# Patient Record
Sex: MALE | Race: WHITE | NOT HISPANIC OR LATINO | URBAN - METROPOLITAN AREA
[De-identification: names, ages, dates, MRNs, and addresses within clinical notes are randomized per-mention and may not be internally consistent; named-entity substitution may affect disease eponyms.]

---

## 2017-04-13 ENCOUNTER — EMERGENCY (EMERGENCY)
Facility: HOSPITAL | Age: 82
LOS: 1 days | Discharge: HOME | End: 2017-04-13
Attending: EMERGENCY MEDICINE | Admitting: EMERGENCY MEDICINE
Payer: MEDICARE

## 2017-04-13 VITALS
SYSTOLIC BLOOD PRESSURE: 198 MMHG | HEART RATE: 66 BPM | RESPIRATION RATE: 18 BRPM | TEMPERATURE: 98 F | DIASTOLIC BLOOD PRESSURE: 77 MMHG | OXYGEN SATURATION: 97 %

## 2017-04-13 VITALS
RESPIRATION RATE: 18 BRPM | HEART RATE: 63 BPM | SYSTOLIC BLOOD PRESSURE: 164 MMHG | DIASTOLIC BLOOD PRESSURE: 71 MMHG | OXYGEN SATURATION: 97 %

## 2017-04-13 DIAGNOSIS — R10.9 UNSPECIFIED ABDOMINAL PAIN: ICD-10-CM

## 2017-04-13 DIAGNOSIS — Z95.1 PRESENCE OF AORTOCORONARY BYPASS GRAFT: Chronic | ICD-10-CM

## 2017-04-13 LAB
ALBUMIN SERPL ELPH-MCNC: 4.4 G/DL — SIGNIFICANT CHANGE UP (ref 3.3–5)
ALP SERPL-CCNC: 45 U/L — SIGNIFICANT CHANGE UP (ref 40–120)
ALT FLD-CCNC: 17 U/L RC — SIGNIFICANT CHANGE UP (ref 10–45)
ANION GAP SERPL CALC-SCNC: 14 MMOL/L — SIGNIFICANT CHANGE UP (ref 5–17)
AST SERPL-CCNC: 21 U/L — SIGNIFICANT CHANGE UP (ref 10–40)
BASOPHILS # BLD AUTO: 0 K/UL — SIGNIFICANT CHANGE UP (ref 0–0.2)
BASOPHILS NFR BLD AUTO: 0.3 % — SIGNIFICANT CHANGE UP (ref 0–2)
BILIRUB SERPL-MCNC: 0.7 MG/DL — SIGNIFICANT CHANGE UP (ref 0.2–1.2)
BUN SERPL-MCNC: 27 MG/DL — HIGH (ref 7–23)
CALCIUM SERPL-MCNC: 9.2 MG/DL — SIGNIFICANT CHANGE UP (ref 8.4–10.5)
CHLORIDE SERPL-SCNC: 105 MMOL/L — SIGNIFICANT CHANGE UP (ref 96–108)
CO2 SERPL-SCNC: 23 MMOL/L — SIGNIFICANT CHANGE UP (ref 22–31)
CREAT SERPL-MCNC: 1.42 MG/DL — HIGH (ref 0.5–1.3)
EOSINOPHIL # BLD AUTO: 0.1 K/UL — SIGNIFICANT CHANGE UP (ref 0–0.5)
EOSINOPHIL NFR BLD AUTO: 1.1 % — SIGNIFICANT CHANGE UP (ref 0–6)
GAS PNL BLDV: SIGNIFICANT CHANGE UP
GLUCOSE SERPL-MCNC: 131 MG/DL — HIGH (ref 70–99)
HCT VFR BLD CALC: 43.6 % — SIGNIFICANT CHANGE UP (ref 39–50)
HGB BLD-MCNC: 14.8 G/DL — SIGNIFICANT CHANGE UP (ref 13–17)
LYMPHOCYTES # BLD AUTO: 1.5 K/UL — SIGNIFICANT CHANGE UP (ref 1–3.3)
LYMPHOCYTES # BLD AUTO: 23.1 % — SIGNIFICANT CHANGE UP (ref 13–44)
MCHC RBC-ENTMCNC: 31.6 PG — SIGNIFICANT CHANGE UP (ref 27–34)
MCHC RBC-ENTMCNC: 33.9 GM/DL — SIGNIFICANT CHANGE UP (ref 32–36)
MCV RBC AUTO: 93.2 FL — SIGNIFICANT CHANGE UP (ref 80–100)
MONOCYTES # BLD AUTO: 0.4 K/UL — SIGNIFICANT CHANGE UP (ref 0–0.9)
MONOCYTES NFR BLD AUTO: 6.4 % — SIGNIFICANT CHANGE UP (ref 2–14)
NEUTROPHILS # BLD AUTO: 4.5 K/UL — SIGNIFICANT CHANGE UP (ref 1.8–7.4)
NEUTROPHILS NFR BLD AUTO: 69.1 % — SIGNIFICANT CHANGE UP (ref 43–77)
PLATELET # BLD AUTO: 142 K/UL — LOW (ref 150–400)
POTASSIUM SERPL-MCNC: 4.5 MMOL/L — SIGNIFICANT CHANGE UP (ref 3.5–5.3)
POTASSIUM SERPL-SCNC: 4.5 MMOL/L — SIGNIFICANT CHANGE UP (ref 3.5–5.3)
PROT SERPL-MCNC: 8 G/DL — SIGNIFICANT CHANGE UP (ref 6–8.3)
RBC # BLD: 4.68 M/UL — SIGNIFICANT CHANGE UP (ref 4.2–5.8)
RBC # FLD: 13.9 % — SIGNIFICANT CHANGE UP (ref 10.3–14.5)
SODIUM SERPL-SCNC: 142 MMOL/L — SIGNIFICANT CHANGE UP (ref 135–145)
WBC # BLD: 6.5 K/UL — SIGNIFICANT CHANGE UP (ref 3.8–10.5)
WBC # FLD AUTO: 6.5 K/UL — SIGNIFICANT CHANGE UP (ref 3.8–10.5)

## 2017-04-13 PROCEDURE — 80053 COMPREHEN METABOLIC PANEL: CPT

## 2017-04-13 PROCEDURE — 74020: CPT

## 2017-04-13 PROCEDURE — 85027 COMPLETE CBC AUTOMATED: CPT

## 2017-04-13 PROCEDURE — 82947 ASSAY GLUCOSE BLOOD QUANT: CPT

## 2017-04-13 PROCEDURE — 85014 HEMATOCRIT: CPT

## 2017-04-13 PROCEDURE — 96374 THER/PROPH/DIAG INJ IV PUSH: CPT | Mod: XU

## 2017-04-13 PROCEDURE — 74177 CT ABD & PELVIS W/CONTRAST: CPT | Mod: 26

## 2017-04-13 PROCEDURE — 99284 EMERGENCY DEPT VISIT MOD MDM: CPT | Mod: 25

## 2017-04-13 PROCEDURE — 83605 ASSAY OF LACTIC ACID: CPT

## 2017-04-13 PROCEDURE — 83690 ASSAY OF LIPASE: CPT

## 2017-04-13 PROCEDURE — 82330 ASSAY OF CALCIUM: CPT

## 2017-04-13 PROCEDURE — 99284 EMERGENCY DEPT VISIT MOD MDM: CPT | Mod: GC

## 2017-04-13 PROCEDURE — 82803 BLOOD GASES ANY COMBINATION: CPT

## 2017-04-13 PROCEDURE — 96375 TX/PRO/DX INJ NEW DRUG ADDON: CPT

## 2017-04-13 PROCEDURE — 82565 ASSAY OF CREATININE: CPT

## 2017-04-13 PROCEDURE — 74020: CPT | Mod: 26

## 2017-04-13 PROCEDURE — 74177 CT ABD & PELVIS W/CONTRAST: CPT

## 2017-04-13 PROCEDURE — 84295 ASSAY OF SERUM SODIUM: CPT

## 2017-04-13 PROCEDURE — 84132 ASSAY OF SERUM POTASSIUM: CPT

## 2017-04-13 PROCEDURE — 82435 ASSAY OF BLOOD CHLORIDE: CPT

## 2017-04-13 RX ORDER — SODIUM CHLORIDE 9 MG/ML
1000 INJECTION INTRAMUSCULAR; INTRAVENOUS; SUBCUTANEOUS ONCE
Qty: 0 | Refills: 0 | Status: COMPLETED | OUTPATIENT
Start: 2017-04-13 | End: 2017-04-13

## 2017-04-13 RX ORDER — ONDANSETRON 8 MG/1
4 TABLET, FILM COATED ORAL ONCE
Qty: 0 | Refills: 0 | Status: COMPLETED | OUTPATIENT
Start: 2017-04-13 | End: 2017-04-13

## 2017-04-13 RX ORDER — ACETAMINOPHEN 500 MG
1000 TABLET ORAL ONCE
Qty: 0 | Refills: 0 | Status: COMPLETED | OUTPATIENT
Start: 2017-04-13 | End: 2017-04-13

## 2017-04-13 RX ADMIN — ONDANSETRON 4 MILLIGRAM(S): 8 TABLET, FILM COATED ORAL at 11:36

## 2017-04-13 RX ADMIN — SODIUM CHLORIDE 2000 MILLILITER(S): 9 INJECTION INTRAMUSCULAR; INTRAVENOUS; SUBCUTANEOUS at 11:39

## 2017-04-13 RX ADMIN — Medication 1000 MILLIGRAM(S): at 12:06

## 2017-04-13 RX ADMIN — Medication 400 MILLIGRAM(S): at 11:36

## 2017-04-13 NOTE — ED PROVIDER NOTE - OBJECTIVE STATEMENT
Attendinyo male presents with pain at the site of abdominal hernia since waking up this AM.  +Belching.  Feels like he has to have a bowel movement or pass gas but he can't.  No vomiting.  Had a bowel movement this AM but it was small.  No fever.  Nothing seems to make symptoms better or worse.  Has not seen a surgeon for this before.  No weight loss.  No blood in stool.  Decreased appetite this AM.  No appetite now.  PCP:  Dr. Bailey

## 2017-04-13 NOTE — ED PROVIDER NOTE - CARE PLAN
Principal Discharge DX:	Ventral hernia without obstruction or gangrene  Instructions for follow-up, activity and diet:	Please return to the OR if you have vomiting, pain, or are unable to push your hernia back in.  You can see a surgeon as an outpatient if you have continued problems with your hernia.

## 2017-04-13 NOTE — ED PROVIDER NOTE - PLAN OF CARE
Please return to the OR if you have vomiting, pain, or are unable to push your hernia back in.  You can see a surgeon as an outpatient if you have continued problems with your hernia.

## 2017-04-13 NOTE — ED PROVIDER NOTE - PROGRESS NOTE DETAILS
Patient's pain is improved.  BP still elevated.  Patient did not take home BP meds this AM as he did not eat.  May take home meds now Patient still not experiencing pain or nausea.  Will PO challenge Patient tolerated PO.  Surgery cleared patient for discharge with precautions for hernia incarceration.  Patient can follow-up with surgery outpatient as needed

## 2017-04-13 NOTE — ED PROVIDER NOTE - MEDICAL DECISION MAKING DETAILS
Abdominal distention and pain at hernia site.  CT abd and pelvis.  CMP, CBC, VBG.  Surgery to evaluate.

## 2019-05-24 ENCOUNTER — INPATIENT (INPATIENT)
Facility: HOSPITAL | Age: 84
LOS: 5 days | Discharge: HOME | DRG: 291 | End: 2019-05-30
Attending: HOSPITALIST | Admitting: HOSPITALIST
Payer: MEDICARE

## 2019-05-24 VITALS
WEIGHT: 125 LBS | OXYGEN SATURATION: 90 % | SYSTOLIC BLOOD PRESSURE: 142 MMHG | RESPIRATION RATE: 22 BRPM | HEART RATE: 67 BPM | DIASTOLIC BLOOD PRESSURE: 70 MMHG | HEIGHT: 67 IN | TEMPERATURE: 99 F

## 2019-05-24 DIAGNOSIS — Z95.1 PRESENCE OF AORTOCORONARY BYPASS GRAFT: Chronic | ICD-10-CM

## 2019-05-24 DIAGNOSIS — R06.02 SHORTNESS OF BREATH: ICD-10-CM

## 2019-05-24 DIAGNOSIS — E03.9 HYPOTHYROIDISM, UNSPECIFIED: ICD-10-CM

## 2019-05-24 DIAGNOSIS — I10 ESSENTIAL (PRIMARY) HYPERTENSION: ICD-10-CM

## 2019-05-24 DIAGNOSIS — K58.9 IRRITABLE BOWEL SYNDROME WITHOUT DIARRHEA: ICD-10-CM

## 2019-05-24 DIAGNOSIS — I25.810 ATHEROSCLEROSIS OF CORONARY ARTERY BYPASS GRAFT(S) WITHOUT ANGINA PECTORIS: ICD-10-CM

## 2019-05-24 DIAGNOSIS — E11.9 TYPE 2 DIABETES MELLITUS WITHOUT COMPLICATIONS: ICD-10-CM

## 2019-05-24 DIAGNOSIS — F41.9 ANXIETY DISORDER, UNSPECIFIED: ICD-10-CM

## 2019-05-24 DIAGNOSIS — Z29.9 ENCOUNTER FOR PROPHYLACTIC MEASURES, UNSPECIFIED: ICD-10-CM

## 2019-05-24 DIAGNOSIS — R60.0 LOCALIZED EDEMA: ICD-10-CM

## 2019-05-24 DIAGNOSIS — N18.3 CHRONIC KIDNEY DISEASE, STAGE 3 (MODERATE): ICD-10-CM

## 2019-05-24 LAB
ALBUMIN SERPL ELPH-MCNC: 3.4 G/DL — SIGNIFICANT CHANGE UP (ref 3.3–5)
ALP SERPL-CCNC: 48 U/L — SIGNIFICANT CHANGE UP (ref 40–120)
ALT FLD-CCNC: 23 U/L — SIGNIFICANT CHANGE UP (ref 10–45)
ANION GAP SERPL CALC-SCNC: 13 MMOL/L — SIGNIFICANT CHANGE UP (ref 5–17)
APTT BLD: 32 SEC — SIGNIFICANT CHANGE UP (ref 27.5–36.3)
AST SERPL-CCNC: 34 U/L — SIGNIFICANT CHANGE UP (ref 10–40)
BASE EXCESS BLDV CALC-SCNC: 0.7 MMOL/L — SIGNIFICANT CHANGE UP (ref -2–2)
BILIRUB SERPL-MCNC: 1.2 MG/DL — SIGNIFICANT CHANGE UP (ref 0.2–1.2)
BUN SERPL-MCNC: 38 MG/DL — HIGH (ref 7–23)
CA-I SERPL-SCNC: 1.18 MMOL/L — SIGNIFICANT CHANGE UP (ref 1.12–1.3)
CALCIUM SERPL-MCNC: 9 MG/DL — SIGNIFICANT CHANGE UP (ref 8.4–10.5)
CHLORIDE BLDV-SCNC: 107 MMOL/L — SIGNIFICANT CHANGE UP (ref 96–108)
CHLORIDE SERPL-SCNC: 102 MMOL/L — SIGNIFICANT CHANGE UP (ref 96–108)
CK MB CFR SERPL CALC: 2.5 NG/ML — SIGNIFICANT CHANGE UP (ref 0–6.7)
CK SERPL-CCNC: 79 U/L — SIGNIFICANT CHANGE UP (ref 30–200)
CO2 BLDV-SCNC: 26 MMOL/L — SIGNIFICANT CHANGE UP (ref 22–30)
CO2 SERPL-SCNC: 22 MMOL/L — SIGNIFICANT CHANGE UP (ref 22–31)
CREAT SERPL-MCNC: 1.62 MG/DL — HIGH (ref 0.5–1.3)
GAS PNL BLDV: 135 MMOL/L — LOW (ref 136–145)
GAS PNL BLDV: SIGNIFICANT CHANGE UP
GAS PNL BLDV: SIGNIFICANT CHANGE UP
GLUCOSE BLDV-MCNC: 121 MG/DL — HIGH (ref 70–99)
GLUCOSE SERPL-MCNC: 133 MG/DL — HIGH (ref 70–99)
HCO3 BLDV-SCNC: 25 MMOL/L — SIGNIFICANT CHANGE UP (ref 21–29)
HCT VFR BLD CALC: 36.1 % — LOW (ref 39–50)
HCT VFR BLDA CALC: 36 % — LOW (ref 39–50)
HGB BLD CALC-MCNC: 11.6 G/DL — LOW (ref 13–17)
HGB BLD-MCNC: 11.7 G/DL — LOW (ref 13–17)
INR BLD: 1.41 RATIO — HIGH (ref 0.88–1.16)
LACTATE BLDV-MCNC: 1.4 MMOL/L — SIGNIFICANT CHANGE UP (ref 0.7–2)
MAGNESIUM SERPL-MCNC: 2.2 MG/DL — SIGNIFICANT CHANGE UP (ref 1.6–2.6)
MCHC RBC-ENTMCNC: 31.5 PG — SIGNIFICANT CHANGE UP (ref 27–34)
MCHC RBC-ENTMCNC: 32.5 GM/DL — SIGNIFICANT CHANGE UP (ref 32–36)
MCV RBC AUTO: 96.9 FL — SIGNIFICANT CHANGE UP (ref 80–100)
NT-PROBNP SERPL-SCNC: HIGH PG/ML (ref 0–300)
PCO2 BLDV: 42 MMHG — SIGNIFICANT CHANGE UP (ref 35–50)
PH BLDV: 7.4 — SIGNIFICANT CHANGE UP (ref 7.35–7.45)
PLATELET # BLD AUTO: 158 K/UL — SIGNIFICANT CHANGE UP (ref 150–400)
PO2 BLDV: 28 MMHG — SIGNIFICANT CHANGE UP (ref 25–45)
POTASSIUM BLDV-SCNC: 3.8 MMOL/L — SIGNIFICANT CHANGE UP (ref 3.5–5.3)
POTASSIUM SERPL-MCNC: 3.9 MMOL/L — SIGNIFICANT CHANGE UP (ref 3.5–5.3)
POTASSIUM SERPL-SCNC: 3.9 MMOL/L — SIGNIFICANT CHANGE UP (ref 3.5–5.3)
PROT SERPL-MCNC: 7.2 G/DL — SIGNIFICANT CHANGE UP (ref 6–8.3)
PROTHROM AB SERPL-ACNC: 16.4 SEC — HIGH (ref 10–12.9)
RAPID RVP RESULT: SIGNIFICANT CHANGE UP
RBC # BLD: 3.72 M/UL — LOW (ref 4.2–5.8)
RBC # FLD: 13.4 % — SIGNIFICANT CHANGE UP (ref 10.3–14.5)
SAO2 % BLDV: 49 % — LOW (ref 67–88)
SODIUM SERPL-SCNC: 137 MMOL/L — SIGNIFICANT CHANGE UP (ref 135–145)
TROPONIN T, HIGH SENSITIVITY RESULT: 171 NG/L — HIGH (ref 0–51)
TROPONIN T, HIGH SENSITIVITY RESULT: 198 NG/L — HIGH (ref 0–51)
WBC # BLD: 9.8 K/UL — SIGNIFICANT CHANGE UP (ref 3.8–10.5)
WBC # FLD AUTO: 9.8 K/UL — SIGNIFICANT CHANGE UP (ref 3.8–10.5)

## 2019-05-24 PROCEDURE — 93010 ELECTROCARDIOGRAM REPORT: CPT

## 2019-05-24 PROCEDURE — 99285 EMERGENCY DEPT VISIT HI MDM: CPT | Mod: GC,25

## 2019-05-24 PROCEDURE — 71045 X-RAY EXAM CHEST 1 VIEW: CPT | Mod: 26

## 2019-05-24 PROCEDURE — 93970 EXTREMITY STUDY: CPT | Mod: 26

## 2019-05-24 RX ORDER — FUROSEMIDE 40 MG
40 TABLET ORAL
Refills: 0 | Status: DISCONTINUED | OUTPATIENT
Start: 2019-05-24 | End: 2019-05-28

## 2019-05-24 RX ORDER — METFORMIN HYDROCHLORIDE 850 MG/1
0 TABLET ORAL
Qty: 0 | Refills: 0 | DISCHARGE

## 2019-05-24 RX ORDER — LEVOTHYROXINE SODIUM 125 MCG
0 TABLET ORAL
Qty: 0 | Refills: 0 | DISCHARGE

## 2019-05-24 RX ORDER — LEVOTHYROXINE SODIUM 125 MCG
75 TABLET ORAL DAILY
Refills: 0 | Status: DISCONTINUED | OUTPATIENT
Start: 2019-05-24 | End: 2019-05-30

## 2019-05-24 RX ORDER — CITALOPRAM 10 MG/1
20 TABLET, FILM COATED ORAL DAILY
Refills: 0 | Status: DISCONTINUED | OUTPATIENT
Start: 2019-05-24 | End: 2019-05-30

## 2019-05-24 RX ORDER — NIACIN 50 MG
0 TABLET ORAL
Qty: 0 | Refills: 0 | DISCHARGE

## 2019-05-24 RX ORDER — ASPIRIN/CALCIUM CARB/MAGNESIUM 324 MG
0 TABLET ORAL
Qty: 0 | Refills: 0 | DISCHARGE

## 2019-05-24 RX ORDER — CLOPIDOGREL BISULFATE 75 MG/1
0 TABLET, FILM COATED ORAL
Qty: 0 | Refills: 0 | DISCHARGE

## 2019-05-24 RX ORDER — HEPARIN SODIUM 5000 [USP'U]/ML
INJECTION INTRAVENOUS; SUBCUTANEOUS
Qty: 25000 | Refills: 0 | Status: DISCONTINUED | OUTPATIENT
Start: 2019-05-24 | End: 2019-05-27

## 2019-05-24 RX ORDER — FENOFIBRATE,MICRONIZED 130 MG
0 CAPSULE ORAL
Qty: 0 | Refills: 0 | DISCHARGE

## 2019-05-24 RX ORDER — ALPRAZOLAM 0.25 MG
0.25 TABLET ORAL DAILY
Refills: 0 | Status: DISCONTINUED | OUTPATIENT
Start: 2019-05-24 | End: 2019-05-30

## 2019-05-24 RX ORDER — CARVEDILOL PHOSPHATE 80 MG/1
25 CAPSULE, EXTENDED RELEASE ORAL EVERY 12 HOURS
Refills: 0 | Status: DISCONTINUED | OUTPATIENT
Start: 2019-05-24 | End: 2019-05-30

## 2019-05-24 RX ORDER — AMLODIPINE BESYLATE 2.5 MG/1
0 TABLET ORAL
Qty: 0 | Refills: 0 | DISCHARGE

## 2019-05-24 RX ORDER — FUROSEMIDE 40 MG
40 TABLET ORAL ONCE
Refills: 0 | Status: COMPLETED | OUTPATIENT
Start: 2019-05-24 | End: 2019-05-24

## 2019-05-24 RX ORDER — CARVEDILOL PHOSPHATE 80 MG/1
0 CAPSULE, EXTENDED RELEASE ORAL
Qty: 0 | Refills: 0 | DISCHARGE

## 2019-05-24 RX ORDER — ASPIRIN/CALCIUM CARB/MAGNESIUM 324 MG
324 TABLET ORAL ONCE
Refills: 0 | Status: COMPLETED | OUTPATIENT
Start: 2019-05-24 | End: 2019-05-24

## 2019-05-24 RX ORDER — AMLODIPINE BESYLATE 2.5 MG/1
5 TABLET ORAL DAILY
Refills: 0 | Status: DISCONTINUED | OUTPATIENT
Start: 2019-05-24 | End: 2019-05-30

## 2019-05-24 RX ORDER — NIACIN 50 MG
500 TABLET ORAL AT BEDTIME
Refills: 0 | Status: DISCONTINUED | OUTPATIENT
Start: 2019-05-24 | End: 2019-05-30

## 2019-05-24 RX ORDER — CLOPIDOGREL BISULFATE 75 MG/1
75 TABLET, FILM COATED ORAL DAILY
Refills: 0 | Status: DISCONTINUED | OUTPATIENT
Start: 2019-05-24 | End: 2019-05-29

## 2019-05-24 RX ORDER — HEPARIN SODIUM 5000 [USP'U]/ML
6000 INJECTION INTRAVENOUS; SUBCUTANEOUS EVERY 6 HOURS
Refills: 0 | Status: DISCONTINUED | OUTPATIENT
Start: 2019-05-24 | End: 2019-05-25

## 2019-05-24 RX ORDER — ASPIRIN/CALCIUM CARB/MAGNESIUM 324 MG
81 TABLET ORAL DAILY
Refills: 0 | Status: DISCONTINUED | OUTPATIENT
Start: 2019-05-24 | End: 2019-05-30

## 2019-05-24 RX ORDER — HEPARIN SODIUM 5000 [USP'U]/ML
5000 INJECTION INTRAVENOUS; SUBCUTANEOUS EVERY 8 HOURS
Refills: 0 | Status: DISCONTINUED | OUTPATIENT
Start: 2019-05-24 | End: 2019-05-24

## 2019-05-24 RX ADMIN — CARVEDILOL PHOSPHATE 25 MILLIGRAM(S): 80 CAPSULE, EXTENDED RELEASE ORAL at 18:56

## 2019-05-24 RX ADMIN — HEPARIN SODIUM 1000 UNIT(S)/HR: 5000 INJECTION INTRAVENOUS; SUBCUTANEOUS at 21:06

## 2019-05-24 RX ADMIN — Medication 40 MILLIGRAM(S): at 15:45

## 2019-05-24 RX ADMIN — Medication 40 MILLIGRAM(S): at 18:17

## 2019-05-24 RX ADMIN — Medication 500 MILLIGRAM(S): at 22:30

## 2019-05-24 RX ADMIN — Medication 324 MILLIGRAM(S): at 16:53

## 2019-05-24 NOTE — ED PROVIDER NOTE - CLINICAL SUMMARY MEDICAL DECISION MAKING FREE TEXT BOX
89M, p.w shortness of breath w. exertion, cough and weakness for 2days, denied fever, lightheadedness, chestpain, physical exam showed R side crackles, concerning for pulm edema vs PE vs acs vs CHF, will get labs and admit 89M, p.w shortness of breath w. exertion, cough and weakness for 2days, denied fever, lightheadedness, chestpain, physical exam showed R side crackles, concerning for pulm edema vs PE vs acs vs CHF, will get labs and admit  Attending Halie Ogden: 89 y/o male presenting with dyspnea on exertion. concern for new onset CHF, vs anginal equivalent for ACS. ekg shows LBBB which reportedly is not new. no active chest pain at this time. on exam pt with decreased breath sounds and pocus with B lines. will give dose of lasix. with sob and LE edema will also check duplex to evaluate for DVT as pt with known renal disease and likely cannot obtain CTA. will obtain labs, tele, and admit for further cardiac work up

## 2019-05-24 NOTE — ED PROVIDER NOTE - ATTENDING CONTRIBUTION TO CARE
Attending MD Halie Ogden:  I personally have seen and examined this patient.  Resident note reviewed and agree on plan of care and except where noted.  See HPI, PE, and MDM for details.

## 2019-05-24 NOTE — ED PROVIDER NOTE - OBJECTIVE STATEMENT
Attending Halie Ogden: 89 y/o male h/o CABG, HTn, CADpresenting with worsening sob. pt states has been feeling more sob for last week. has chronic LE edema. is on lasix 20mg every othr day which he has been taking. no fevers or chills. went to see his doctor today and refrred to the ED for further management. denies any chest pain. sob with walking. unable to lay flat which is new. no pain with breathing. no sick contacts

## 2019-05-24 NOTE — ED PROVIDER NOTE - PROGRESS NOTE DETAILS
Attending Halie Ogden: labs shows elevated bnp and mild troponin leak. pt denies any chest pain. could be secondary to pulmonary edema. pt on monitor. 40mg lasix ordered. will monitor output. cxr shows mild interstitial edema nad plueral effusions. will admit Attending Halie Ogden: received call pt with evidence of dvt. pt denies any recent head trauma. no black or bloody stools. will weight and start on heparin gtt. [facility] called

## 2019-05-24 NOTE — H&P ADULT - NSHPSOCIALHISTORY_GEN_ALL_CORE
quit smoking 30 years ago, social EtOH history (drink 1 glass of wine daily). Denied illicit drug use.

## 2019-05-24 NOTE — ED PROVIDER NOTE - CARE PLAN
Principal Discharge DX:	SOB (shortness of breath) on exertion  Secondary Diagnosis:	Lower extremity edema

## 2019-05-24 NOTE — CHART NOTE - NSCHARTNOTEFT_GEN_A_CORE
[id number]  [facility]    Received signout on patient, requested to follow up on cardiac enzymes. Patient admitted with     Patient seen and examined.  Patient had no complaints of     Plan of Care/ Interventions:  -Case discussed with attending Dr. Currie at 19:26  -Obtain house cardiology/cardiac cath consult  -Per attending, repeat cardiac enzymes in AM  -Monitor on telemetry         Albin Armenta PA-C  Dept of Medicine  [phone]  [facility]    Received signout on patient, requested to follow up on cardiac enzymes. Briefly, this is a 89 y/o male PMHx of CAD s/p CABG, complicated by perforated peptic ulcer s/p prolonged hospitalization back in 1990s,  HTN, diabetes (diet controlled, no longer on Metformin, last HgbA1C 6%) presenting with worsening shortness of breath x 4 days, increased LE edema bilaterally. Patient found to have elevated troponin, elevated pBNP, as well as a LLE DVT.   Repeat cardiac enzymes with positive delta in high-sensitivity troponin. Patient seen and assessed at bedside, NAD, alert and awake; patient's son present at bedside. Patient denied headache, dizziness, chest pain, acute dyspnea, nausea, vomiting, or abdominal pain.    Physical Exam:  General: Obese male sitting up in bed, on NC, NAD, AOx3, nontoxic appearing  Head:  NC/AT  CV: RRR, S1S2   Respiratory: (+) bibasilar crackles, nonlabored  MSK: (+) 2+ BLLE edema, + peripheral pulses, FROM all 4 extremity  Skin: (+) warm, dry   Psych: Appropriate affect       Troponinemia Plan of Care/ Interventions:  -Case discussed with attending Dr. Currie, will obtain house cardiology/cardiac cath consult  -Per attending, repeat cardiac enzymes in AM  -Monitor on telemetry   -Heparin gtt for treatment of LLE DVT  -Per Dr. Currie, will defer on V/Q scan or CTA chest at this time given patient is already on heparin gtt for treatment of DVT  -Continue to closely monitor patient/vitals        Albin Armenta PA-C  Dept of Medicine  [phone]  [facility]    Received signout on patient, requested to follow up on cardiac enzymes. Briefly, this is a 89 y/o male PMHx of CAD s/p CABG, complicated by perforated peptic ulcer s/p prolonged hospitalization back in 1990s,  HTN, diabetes (diet controlled, no longer on Metformin, last HgbA1C 6%) presenting with worsening shortness of breath x 4 days, increased LE edema bilaterally. Patient found to have elevated troponin, elevated pBNP, as well as a LLE DVT.   Repeat cardiac enzymes with positive delta in high-sensitivity troponin. Patient seen and assessed at bedside, NAD, alert and awake; patient's son present at bedside. Patient denied headache, dizziness, chest pain, acute dyspnea, nausea, vomiting, or abdominal pain.    Physical Exam:  General: Obese male sitting up in bed, on NC, NAD, AOx3, nontoxic appearing  Head:  NC/AT  CV: RRR, S1S2   Respiratory: (+) bibasilar crackles, nonlabored  MSK: (+) 2+ BLLE edema, + peripheral pulses, FROM all 4 extremity  Skin: (+) warm, dry   Psych: Appropriate affect       Troponinemia Plan of Care/ Interventions:  -EKG completed in ED however unable to located in chart; per ED provider note, no ST changes  -Repeat EKG completed on floor: SR with 1st degree HB, chronic LBBB, occasional PVCs. HR 61BPM. No acute ST segment elevations/depressions appreciated. No previous EKG available for comparison.   -Case discussed with attending Dr. Currie, will obtain house cardiology/cardiac cath consult  -Per attending, repeat cardiac enzymes in AM  -TTE pending   -Monitor on telemetry   -Heparin gtt for treatment of LLE DVT  -Per Dr. Currie, will defer on V/Q scan or CTA chest at this time given patient is already on heparin gtt for treatment of DVT  -Continue to closely monitor patient/vitals      Albin Armenta PA-C  Dept of Medicine  [phone]  [facility]    Received signout on patient, requested to follow up on cardiac enzymes. Briefly, this is a 89 y/o male PMHx of CAD s/p CABG, complicated by perforated peptic ulcer s/p prolonged hospitalization back in 1990s,  HTN, diabetes (diet controlled, no longer on Metformin, last HgbA1C 6%) presenting with worsening shortness of breath x 4 days, increased LE edema bilaterally. Patient found to have elevated troponin, elevated pBNP, as well as a LLE DVT.   Repeat cardiac enzymes with positive delta in high-sensitivity troponin. Patient seen and assessed at bedside, NAD, alert and awake; patient's son present at bedside. Patient denied headache, dizziness, chest pain, acute dyspnea, nausea, vomiting, or abdominal pain.    Physical Exam:  General: Obese male sitting up in bed, on NC, NAD, AOx3, nontoxic appearing  Head:  NC/AT  CV: RRR, S1S2   Respiratory: (+) bibasilar crackles, nonlabored  MSK: (+) 2+ BLLE edema, + peripheral pulses, FROM all 4 extremity  Skin: (+) warm, dry   Psych: Appropriate affect       Troponinemia Plan of Care/ Interventions:  -EKG completed in ED however unable to located in chart; per ED provider note, no ST changes  -Repeat EKG completed on floor: SR with 1st degree HB, chronic LBBB, occasional PVCs. HR 61BPM. No acute ST segment elevations/depressions appreciated. No previous EKG available for comparison.   -Case discussed with attending Dr. Currie, will obtain house cardiology/cardiac cath consult  -Per attending, repeat cardiac enzymes in AM  -TTE pending   -Monitor on telemetry   -Heparin gtt for treatment of LLE DVT  -Continue with DAPT  -Per Dr. Currie, will defer on V/Q scan or CTA chest at this time given patient is already on heparin gtt for treatment of DVT  -Continue to closely monitor patient/vitals      Albin Armenta PA-C  Dept of Medicine  [phone]

## 2019-05-24 NOTE — H&P ADULT - NSHPPHYSICALEXAM_GEN_ALL_CORE
PHYSICAL EXAM:  GENERAL: NAD, well-developed, comfortable, on nasal canula  HEAD:  Atraumatic, Normocephalic  EYES: EOMI, PERRLA, conjunctiva and sclera clear  NECK: Supple, No JVD  CHEST/LUNG: mild decrease breath sounds bilaterally, basilar crackles; No wheeze   HEART: Regular rate and rhythm; No murmurs, rubs, or gallops  ABDOMEN: Soft, Nontender, Nondistended; Bowel sounds present  Neuro: AAOx3, no focal weakness, 5/5 b/l extremity strength  EXTREMITIES:  2+ Peripheral Pulses, No clubbing, cyanosis, +2 edema  SKIN: No rashes or lesions

## 2019-05-24 NOTE — H&P ADULT - PROBLEM SELECTOR PLAN 1
Likely acute CHF.   elevated proBNP 07184.   EKG with chronic LBBB. Hypoxic to 90%, placed on 2L nasal canula.   s/p 40 mg IV Lasix x 1 in ED with some improvement  will continue Lasix 40 mg IV BID, daily weights, strick I/O  Troponin 171, will check second set in three hours  r/o ACS vs. elevated trop in the setting of demand ischemia/CHF/CKD  cardiology consult.  c/w Coreg 37.5 mg BID, Plavix 75 mg, Asa 81 (received 4 tabs of asa in ED) Likely acute CHF.   elevated proBNP 23492. CXR with pulm edema  EKG with chronic LBBB. Hypoxic to 90%, placed on 2L nasal canula.   s/p 40 mg IV Lasix x 1 in ED with some improvement  will continue Lasix 40 mg IV BID, daily weights, strick I/O  Troponin 171, will check second set in three hours  r/o ACS vs. elevated trop in the setting of demand ischemia/CHF/CKD  cardiology consult.  c/w Coreg 37.5 mg BID, Plavix 75 mg, Asa 81 (received 4 tabs of asa in ED)  check TTE  will check RVP for mild cough.

## 2019-05-24 NOTE — H&P ADULT - NSHPREVIEWOFSYSTEMS_GEN_ALL_CORE
General: + weakness, no fever/chills, no weight loss/gain  Skin/Breast: no rash, no jaundice  Ophthalmologic: no vision changes, no dry eyes   Respiratory and Thorax: no cough, no wheezing, no hemoptysis, + dyspnea  Cardiovascular: no chest pain, +shortness of breath, + orthopnea, + LE edema  Gastrointestinal: no n/v/d, no abdominal pain, no dysphagia   Genitourinary: no dysuria, no frequency, no nocturia, no hematuria  Musculoskeletal: no trauma, no sprain/strain, no myalgias, no arthralgias, no fracture  Neurological: no HA, no dizziness, no weakness, no numbness  Psychiatric: no depression, no SI/HI  Hematology/Lymphatics: no easy bruising  Endocrine: no heat or cold intolerance. no weight gain or loss  Allergic/Immunologic: no allergy or recent reaction

## 2019-05-24 NOTE — ED ADULT NURSE NOTE - NS ED NURSE REPORT GIVEN TO FT
Bedside report given to on coming nurse Dianan Browne RN. Understands pmh, medications given and plan of care for patient. Patient in stable condition, vital signs updated, has no complaints at this time and has been updated on care plan. Explained to patient that it is change of shift and new nurse is taking over, pt verbalized understanding.

## 2019-05-24 NOTE — ED ADULT NURSE NOTE - NSIMPLEMENTINTERV_GEN_ALL_ED
Implemented All Fall with Harm Risk Interventions:  [place] to call system. Call bell, personal items and telephone within reach. Instruct patient to call for assistance. Room bathroom lighting operational. Non-slip footwear when patient is off stretcher. Physically safe environment: no spills, clutter or unnecessary equipment. Stretcher in lowest position, wheels locked, appropriate side rails in place. Provide visual cue, wrist band, yellow gown, etc. Monitor gait and stability. Monitor for mental status changes and reorient to person, place, and time. Review medications for side effects contributing to fall risk. Reinforce activity limits and safety measures with patient and family. Provide visual clues: red socks.

## 2019-05-24 NOTE — H&P ADULT - HISTORY OF PRESENT ILLNESS
89 y/o male h/o CABG, HTn, CADpresenting with worsening sob. pt states has been feeling more sob for last week. has chronic LE edema. is on lasix 20mg every othr day which he has been taking. no fevers or chills. went to see his doctor today and refrred to the ED for further management. denies any chest pain. sob with walking. unable to lay flat which is new. no pain with breathing. no sick contacts 89 y/o male PMHx of CAD s/p CABG, complicated by perforated peptic ucler s/p prolonged hospitalization back in 1990s,  HTN, diabetes (diet controlled, no longer on Metformin, last HgbA1C 6%) presenting with worsening shortness of breath x 4 days, increased LE edema bilaterally. Mild cough, nonproductive, occasional white sputum. He takes Lasix 20mg every other day which he has been taking. States he follows low salt diet, but have been feeling thirsty lately and drinking more water than usual. no sick contact, no fevers or chills. He went to see his doctor today and was referred to the ED for further management. Denies any chest pain. sob with walking. unable to lay flat which is new. no pain with breathing.   In ED, noted to have high ProBNP 91329. EKG with chronic LBBB. hypoxic to 90%, placed on 2L nasal canula. 40 mg IV Lasix x 1 given with some improvement.

## 2019-05-24 NOTE — H&P ADULT - ASSESSMENT
89 y/o male PMHx of CAD s/p CABG, complicated by perforated peptic ucler s/p prolonged hospitalization back in 1990s,  HTN, diabetes (diet controlled, no longer on Metformin, last HgbA1C 6%) presenting with worsening shortness of breath x 4 days, increased LE edema bilaterally.

## 2019-05-24 NOTE — ED PROVIDER NOTE - NS ED ROS FT
GENERAL: No fever or chills, weight changes, nightsweats  EYES: no change in vision  HEENT: no dysplasia, odynophagia, ear pain, rhinorrhea, epistasis   CARDIAC: no chest pain, palpitation   PULMONARY: shortness of breath w. exertion   GI: no abdominal pain, no nausea or no vomiting, no diarrhea or constipation  : No changes in urination for pain/freq.   SKIN: no rashes   NEURO: no headache, numbness/tingling, extremity weakness   MSK: No joint pain

## 2019-05-24 NOTE — CONSULT NOTE ADULT - SUBJECTIVE AND OBJECTIVE BOX
CHIEF COMPLAINT: Fatigue, dyspnea on exertion x 5 days    HISTORY OF PRESENT ILLNESS: 89 yo male w h/o CAD s/p CABG ("30 years ago"), HTN, diet controlled DM, sent to ED by PMD for concern for pulmonary edema after reporting fatigue and dyspnea on exertion since Monday. Pt reports he felt shortness of breath when walking across his home. He also felt his legs were slightly swollen. He felt more fatigue than usual and had no appetite. Pt denies chest pain or palpitations. Reports has never had these symptoms before. Has been compliant with his home medications. Denies any recent cardiac workup.      Allergies    ACE inhibitors (Other)  penicillin (Rash)    Intolerances    	    MEDICATIONS:  amLODIPine   Tablet 5 milliGRAM(s) Oral daily  aspirin  chewable 81 milliGRAM(s) Oral daily  carvedilol 25 milliGRAM(s) Oral every 12 hours  clopidogrel Tablet 75 milliGRAM(s) Oral daily  furosemide   Injectable 40 milliGRAM(s) IV Push two times a day  heparin  Infusion.  Unit(s)/Hr IV Continuous <Continuous>  heparin  Injectable 6000 Unit(s) IV Push every 6 hours PRN        ALPRAZolam 0.25 milliGRAM(s) Oral daily PRN  citalopram 20 milliGRAM(s) Oral daily    dicyclomine 20 milliGRAM(s) Oral daily PRN    levothyroxine 75 MICROGram(s) Oral daily  niacin  milliGRAM(s) Oral at bedtime        PAST MEDICAL & SURGICAL HISTORY:  Stented coronary artery  Hypertension  Diabetes  HTN (hypertension)  CAD (coronary artery disease) of artery bypass graft  S/P CABG (coronary artery bypass graft)      FAMILY HISTORY:  No pertinent family history in first degree relatives      SOCIAL HISTORY:    Non-smoker        REVIEW OF SYSTEMS:  General: Fatigue, no appetite  Skin: no rashes.  Ophthalmologic: no blurred vision, no loss of vision. 	  ENT: no sore throat, rhinorrhea, sinus congestion.  Respiratory: Dyspnea on exertion  Gastrointestinal:  no N/V/D, no melena/hematemesis/hematochezia.  Genitourinary: no dysuria/hesitancy or hematuria.  Musculoskeletal: no myalgias or arthralgias.  Neurological: no changes in vision or hearing, no lightheadedness/dizziness, no syncope/near syncope	  Psychiatric: no unusual stress/anxiety.   Hematology/Lymphatics: no unusual bleeding, bruising and no lymphadenopathy.  Endocrine: no unusual thirst.   All others negative except as stated above and in HPI.    PHYSICAL EXAM:  T(C): 36.4 (05-24-19 @ 20:31), Max: 37 (05-24-19 @ 14:26)  HR: 64 (05-24-19 @ 20:31) (63 - 68)  BP: 131/62 (05-24-19 @ 20:31) (131/62 - 156/64)  RR: 18 (05-24-19 @ 20:31) (18 - 97)  SpO2: 95% (05-24-19 @ 20:31) (90% - 97%)  Wt(kg): --  I&O's Summary      Appearance: No distress	  HEENT:   Normal oral mucosa, PERRL, EOMI	  Lymphatic: No lymphadenopathy  Cardiovascular: Normal S1 S2, No JVD, 2/6 systolic murmur, Trace LE edema  Respiratory: Crackles at bases b/l	  Psychiatry: A & O x 3, Mood & affect appropriate  Gastrointestinal:  Soft, Non-tender, + BS	  Skin: No rashes, No ecchymoses, No cyanosis	  Neurologic: Non-focal  Extremities: Normal range of motion, No clubbing, cyanosis or edema  Vascular: Peripheral pulses palpable 2+ bilaterally        LABS:	 	    CBC Full  -  ( 24 May 2019 15:12 )  WBC Count : 9.8 K/uL  Hemoglobin : 11.7 g/dL  Hematocrit : 36.1 %  Platelet Count - Automated : 158 K/uL  Mean Cell Volume : 96.9 fl  Mean Cell Hemoglobin : 31.5 pg  Mean Cell Hemoglobin Concentration : 32.5 gm/dL  Auto Neutrophil # : x  Auto Lymphocyte # : x  Auto Monocyte # : x  Auto Eosinophil # : x  Auto Basophil # : x  Auto Neutrophil % : x  Auto Lymphocyte % : x  Auto Monocyte % : x  Auto Eosinophil % : x  Auto Basophil % : x    05-24    137  |  102  |  38<H>  ----------------------------<  133<H>  3.9   |  22  |  1.62<H>    Ca    9.0      24 May 2019 15:12  Mg     2.2     05-24    TPro  7.2  /  Alb  3.4  /  TBili  1.2  /  DBili  x   /  AST  34  /  ALT  23  /  AlkPhos  48  05-24      proBNP: Serum Pro-Brain Natriuretic Peptide: 25986 pg/mL (05-24 @ 15:12)    Lipid Profile:   HgA1c:   TSH:       CARDIAC MARKERS:            TELEMETRY: 	  Sinus 60-70 1st degree block PVCs  ECG:  	Sinus HR 61 1st degree AV block LBBB PVC L axis deviation   RADIOLOGY: < from: Xray Chest 1 View AP/PA (05.24.19 @ 15:27) >  Impression: Cardiomegaly. Small left pleural effusion and mild pulmonary   edema.    < end of copied text >    OTHER: 	    PREVIOUS DIAGNOSTIC TESTING:    [ ] Echocardiogram:  [ ]  Catheterization:  [ ] Stress Test:

## 2019-05-24 NOTE — CONSULT NOTE ADULT - ASSESSMENT
89 yo male w h/o CAD s/p CABG ("30 years ago"), HTN, diet controlled DM, sent to ED by PMD for concern for pulmonary edema after reporting fatigue and dyspnea on exertion since Monday.    - Hemodynamically stable at this time. No chest pain.  - EKG shows LBBB which is not new (EKG reviewed from 2015)  - Unlikely this is a Type 1 MI given small rise in troponin and lack of symptoms  - No indication for urgent angiogram at this time, c/w care per pt's cardiologist  - Please call me back if any change in clinical status    Shankar Rain MD  Cardiology Fellow  [phone]  All Cardiology service information can be found 24/7 on [email or web address], password: [email or web address]

## 2019-05-24 NOTE — ED ADULT NURSE NOTE - PMH
CAD (coronary artery disease) of artery bypass graft    Diabetes    HTN (hypertension)    Hypertension    Stented coronary artery

## 2019-05-24 NOTE — H&P ADULT - NSICDXPASTMEDICALHX_GEN_ALL_CORE_FT
PAST MEDICAL HISTORY:  CAD (coronary artery disease) of artery bypass graft     Diabetes     HTN (hypertension)     Hypertension     Stented coronary artery

## 2019-05-24 NOTE — ED ADULT NURSE NOTE - OBJECTIVE STATEMENT
89 y/o male presents to ed c.o sob since Monday. States it is worse with exertion, went to see PCP and was sent in to r/o CHF. Denies chest pain, ha, n/v/d, abdominal pain, f/c, urinary symptoms, hematuria. A&Ox4, 91 RA, placed on 2liters NC, skin warm dry and intact, MAEx4, lungs diminished, abd soft distended/ obese nontender, slight LE swelling. Pt resting comfortably with no complaints at this time. Patient's bed in the lowest position, explained plan of care to patient and family members. Will continue to reassess.

## 2019-05-24 NOTE — H&P ADULT - NSHPLABSRESULTS_GEN_ALL_CORE
LABS:                        11.7   9.8   )-----------( 158      ( 24 May 2019 15:12 )             36.1     05-24    137  |  102  |  38<H>  ----------------------------<  133<H>  3.9   |  22  |  1.62<H>    Ca    9.0      24 May 2019 15:12  Mg     2.2     05-24    TPro  7.2  /  Alb  3.4  /  TBili  1.2  /  DBili  x   /  AST  34  /  ALT  23  /  AlkPhos  48  05-24    PT/INR - ( 24 May 2019 15:12 )   PT: 16.4 sec;   INR: 1.41 ratio         PTT - ( 24 May 2019 15:12 )  PTT:32.0 sec  CAPILLARY BLOOD GLUCOSE                RADIOLOGY & ADDITIONAL TESTS:    Imaging Personally Reviewed:  [x] YES  [ ] NO    Consultant(s) Notes Reviewed:  [x] YES  [ ] NO    Care Discussed with Consultants/Other Providers [x] YES  [ ] NO

## 2019-05-25 DIAGNOSIS — I25.810 ATHEROSCLEROSIS OF CORONARY ARTERY BYPASS GRAFT(S) WITHOUT ANGINA PECTORIS: ICD-10-CM

## 2019-05-25 DIAGNOSIS — I35.0 NONRHEUMATIC AORTIC (VALVE) STENOSIS: ICD-10-CM

## 2019-05-25 DIAGNOSIS — I50.9 HEART FAILURE, UNSPECIFIED: ICD-10-CM

## 2019-05-25 DIAGNOSIS — I82.412 ACUTE EMBOLISM AND THROMBOSIS OF LEFT FEMORAL VEIN: ICD-10-CM

## 2019-05-25 DIAGNOSIS — R74.8 ABNORMAL LEVELS OF OTHER SERUM ENZYMES: ICD-10-CM

## 2019-05-25 LAB
ANION GAP SERPL CALC-SCNC: 15 MMOL/L — SIGNIFICANT CHANGE UP (ref 5–17)
APTT BLD: 40.7 SEC — HIGH (ref 27.5–36.3)
APTT BLD: 47.2 SEC — HIGH (ref 27.5–36.3)
APTT BLD: 53.9 SEC — HIGH (ref 27.5–36.3)
BUN SERPL-MCNC: 36 MG/DL — HIGH (ref 7–23)
CALCIUM SERPL-MCNC: 9 MG/DL — SIGNIFICANT CHANGE UP (ref 8.4–10.5)
CHLORIDE SERPL-SCNC: 100 MMOL/L — SIGNIFICANT CHANGE UP (ref 96–108)
CHOLEST SERPL-MCNC: 113 MG/DL — SIGNIFICANT CHANGE UP (ref 10–199)
CK MB CFR SERPL CALC: 2.8 NG/ML — SIGNIFICANT CHANGE UP (ref 0–6.7)
CK SERPL-CCNC: 59 U/L — SIGNIFICANT CHANGE UP (ref 30–200)
CO2 SERPL-SCNC: 23 MMOL/L — SIGNIFICANT CHANGE UP (ref 22–31)
CREAT SERPL-MCNC: 1.58 MG/DL — HIGH (ref 0.5–1.3)
GLUCOSE SERPL-MCNC: 110 MG/DL — HIGH (ref 70–99)
HBA1C BLD-MCNC: 5.1 % — SIGNIFICANT CHANGE UP (ref 4–5.6)
HCT VFR BLD CALC: 35.6 % — LOW (ref 39–50)
HCT VFR BLD CALC: 36.4 % — LOW (ref 39–50)
HDLC SERPL-MCNC: 24 MG/DL — LOW
HGB BLD-MCNC: 11.8 G/DL — LOW (ref 13–17)
HGB BLD-MCNC: 12.1 G/DL — LOW (ref 13–17)
LIPID PNL WITH DIRECT LDL SERPL: 61 MG/DL — SIGNIFICANT CHANGE UP
MAGNESIUM SERPL-MCNC: 2.2 MG/DL — SIGNIFICANT CHANGE UP (ref 1.6–2.6)
MCHC RBC-ENTMCNC: 32.4 PG — SIGNIFICANT CHANGE UP (ref 27–34)
MCHC RBC-ENTMCNC: 32.5 PG — SIGNIFICANT CHANGE UP (ref 27–34)
MCHC RBC-ENTMCNC: 33.1 GM/DL — SIGNIFICANT CHANGE UP (ref 32–36)
MCHC RBC-ENTMCNC: 33.4 GM/DL — SIGNIFICANT CHANGE UP (ref 32–36)
MCV RBC AUTO: 97.2 FL — SIGNIFICANT CHANGE UP (ref 80–100)
MCV RBC AUTO: 97.8 FL — SIGNIFICANT CHANGE UP (ref 80–100)
PLATELET # BLD AUTO: 149 K/UL — LOW (ref 150–400)
PLATELET # BLD AUTO: 164 K/UL — SIGNIFICANT CHANGE UP (ref 150–400)
POTASSIUM SERPL-MCNC: 3.2 MMOL/L — LOW (ref 3.5–5.3)
POTASSIUM SERPL-SCNC: 3.2 MMOL/L — LOW (ref 3.5–5.3)
RBC # BLD: 3.64 M/UL — LOW (ref 4.2–5.8)
RBC # BLD: 3.74 M/UL — LOW (ref 4.2–5.8)
RBC # FLD: 13.2 % — SIGNIFICANT CHANGE UP (ref 10.3–14.5)
RBC # FLD: 14.2 % — SIGNIFICANT CHANGE UP (ref 10.3–14.5)
SODIUM SERPL-SCNC: 138 MMOL/L — SIGNIFICANT CHANGE UP (ref 135–145)
TOTAL CHOLESTEROL/HDL RATIO MEASUREMENT: 4.7 RATIO — SIGNIFICANT CHANGE UP (ref 3.4–9.6)
TRIGL SERPL-MCNC: 139 MG/DL — SIGNIFICANT CHANGE UP (ref 10–149)
TROPONIN T, HIGH SENSITIVITY RESULT: 213 NG/L — HIGH (ref 0–51)
TSH SERPL-MCNC: 3.17 UIU/ML — SIGNIFICANT CHANGE UP (ref 0.27–4.2)
WBC # BLD: 8.4 K/UL — SIGNIFICANT CHANGE UP (ref 3.8–10.5)
WBC # BLD: 8.61 K/UL — SIGNIFICANT CHANGE UP (ref 3.8–10.5)
WBC # FLD AUTO: 8.4 K/UL — SIGNIFICANT CHANGE UP (ref 3.8–10.5)
WBC # FLD AUTO: 8.61 K/UL — SIGNIFICANT CHANGE UP (ref 3.8–10.5)

## 2019-05-25 RX ORDER — POTASSIUM CHLORIDE 20 MEQ
40 PACKET (EA) ORAL ONCE
Refills: 0 | Status: COMPLETED | OUTPATIENT
Start: 2019-05-25 | End: 2019-05-25

## 2019-05-25 RX ORDER — HEPARIN SODIUM 5000 [USP'U]/ML
6000 INJECTION INTRAVENOUS; SUBCUTANEOUS EVERY 6 HOURS
Refills: 0 | Status: DISCONTINUED | OUTPATIENT
Start: 2019-05-25 | End: 2019-05-27

## 2019-05-25 RX ADMIN — HEPARIN SODIUM 1400 UNIT(S)/HR: 5000 INJECTION INTRAVENOUS; SUBCUTANEOUS at 10:30

## 2019-05-25 RX ADMIN — CARVEDILOL PHOSPHATE 25 MILLIGRAM(S): 80 CAPSULE, EXTENDED RELEASE ORAL at 17:15

## 2019-05-25 RX ADMIN — Medication 500 MILLIGRAM(S): at 21:14

## 2019-05-25 RX ADMIN — HEPARIN SODIUM 1200 UNIT(S)/HR: 5000 INJECTION INTRAVENOUS; SUBCUTANEOUS at 03:38

## 2019-05-25 RX ADMIN — CITALOPRAM 20 MILLIGRAM(S): 10 TABLET, FILM COATED ORAL at 13:31

## 2019-05-25 RX ADMIN — CARVEDILOL PHOSPHATE 25 MILLIGRAM(S): 80 CAPSULE, EXTENDED RELEASE ORAL at 05:59

## 2019-05-25 RX ADMIN — HEPARIN SODIUM 1400 UNIT(S)/HR: 5000 INJECTION INTRAVENOUS; SUBCUTANEOUS at 17:43

## 2019-05-25 RX ADMIN — CLOPIDOGREL BISULFATE 75 MILLIGRAM(S): 75 TABLET, FILM COATED ORAL at 13:31

## 2019-05-25 RX ADMIN — Medication 40 MILLIGRAM(S): at 05:59

## 2019-05-25 RX ADMIN — AMLODIPINE BESYLATE 5 MILLIGRAM(S): 2.5 TABLET ORAL at 05:59

## 2019-05-25 RX ADMIN — Medication 40 MILLIEQUIVALENT(S): at 17:15

## 2019-05-25 RX ADMIN — Medication 75 MICROGRAM(S): at 05:59

## 2019-05-25 RX ADMIN — Medication 81 MILLIGRAM(S): at 13:31

## 2019-05-25 RX ADMIN — Medication 40 MILLIGRAM(S): at 17:15

## 2019-05-25 NOTE — CONSULT NOTE ADULT - ASSESSMENT
89 M h/o CAD s/p remote CABG , LBBB,  HTN, DM, PUD a/w acute heart failure and noted to have elevated troponins. 89 M h/o CAD s/p remote CABG , LBBB, Aortic stenosis, hypothyroid,  HTN, DM, PUD a/w acute heart failure and noted to have elevated troponins.

## 2019-05-25 NOTE — CONSULT NOTE ADULT - PROBLEM SELECTOR RECOMMENDATION 3
-  - cont ASA and plavix.  - cont other home cardiac meds.    will follow.    John Chung M.D., [facility]  [phone] -  - cont ASA and plavix.  - cont other home cardiac meds.

## 2019-05-25 NOTE — CONSULT NOTE ADULT - PROBLEM SELECTOR RECOMMENDATION 4
-  - pt unsure of severity.  - f/u echo - if AS is severe will need to discuss possible eventual TAVR (Pt is very functional for his age and his wife recently had  TAVR at this hospital).    John Chung M.D., [facility]  [phone]

## 2019-05-25 NOTE — CONSULT NOTE ADULT - SUBJECTIVE AND OBJECTIVE BOX
[facility] Cardiology Consult  _________________________    Patient is a 89y old  Male who presents with a chief complaint of dyspnea (24 May 2019 21:30)      HPI:  89 M h/o CAD s/p remote CABG , LBBB,  HTN, DM, PUD a/w 4 days of HAWKINS, worsening LE edema, nonproductive cough without fevers. He felt shortness of breath when walking across his home. + fatigue.  + orthopnea but no PND. No chest pain or palpitations. In the ED he was given  40 mg IV Lasix with some improvement in symptoms.    PAST MEDICAL & SURGICAL HISTORY:  Stented coronary artery  Hypertension  Diabetes  HTN (hypertension)  CAD (coronary artery disease) of artery bypass graft  S/P CABG (coronary artery bypass graft)  LBBB      MEDICATIONS  (STANDING):  amLODIPine   Tablet 5 milliGRAM(s) Oral daily  aspirin  chewable 81 milliGRAM(s) Oral daily  carvedilol 25 milliGRAM(s) Oral every 12 hours  citalopram 20 milliGRAM(s) Oral daily  clopidogrel Tablet 75 milliGRAM(s) Oral daily  furosemide   Injectable 40 milliGRAM(s) IV Push two times a day  heparin  Infusion.  Unit(s)/Hr (10 mL/Hr) IV Continuous <Continuous>  levothyroxine 75 MICROGram(s) Oral daily  niacin  milliGRAM(s) Oral at bedtime    MEDICATIONS  (PRN):  ALPRAZolam 0.25 milliGRAM(s) Oral daily PRN anxiety  dicyclomine 20 milliGRAM(s) Oral daily PRN diarrhea  heparin  Injectable 6000 Unit(s) IV Push every 6 hours PRN For aPTT less than 40      Allergies    ACE inhibitors (Other)  penicillin (Rash)    Intolerances        Social Histroy: Tobacco- , ETOH-, Illicit Drugs-    T(C): 36.9 (05-25-19 @ 04:00), Max: 37 (05-24-19 @ 14:26)  HR: 68 (05-25-19 @ 09:07) (58 - 68)  BP: 161/78 (05-25-19 @ 05:51) (131/62 - 161/78)  RR: 18 (05-25-19 @ 04:00) (18 - 97)  SpO2: 96% (05-25-19 @ 09:07) (90% - 98%)  I&O's Summary    24 May 2019 07:01  -  25 May 2019 07:00  --------------------------------------------------------  IN: 300 mL / OUT: 920 mL / NET: -620 mL    25 May 2019 07:01  -  25 May 2019 11:23  --------------------------------------------------------  IN: 360 mL / OUT: 0 mL / NET: 360 mL        Review of Systems:  Constitutional: [ ] Fever [ ] Chills [ ] Fatigue [ ] Weight change   HEENT: [ ] Blurred vision [ ] Eye Pain [ ] Headache [ ] Runny nose [ ] Sore Throat   Respiratory: [ ] Cough [ ] Wheezing [ ] Shortness of breath  Cardiovascular: [ ] Chest Pain [ ] Palpitations [x ] HAWKINS [ ] PND [x ] Orthopnea  Gastrointestinal: [ ] Abdominal Pain [ ] Diarrhea [ ] Constipation [ ] Hemorrhoids [ ] Nausea [ ] Vomiting  Genitourinary: [ ] Nocturia [ ] Dysuria [ ] Incontinence  Extremities: [x ] Swelling [ ] Joint Pain  Neurologic: [ ] Focal deficit [ ] Paresthesias [ ] Syncope  Lymphatic: [ ] Swelling [ ] Lymphadenopathy   Skin: [ ] Rash [ ] Ecchymoses [ ] Wounds [ ] Lesions  Psychiatry: [ ] Depression [ ] Suicidal/Homicidal Ideation [ ] Anxiety [ ] Sleep Disturbances  [x ] 10 point review of systems is otherwise negative except as mentioned above            [ ]Unable to obtain    PHYSICAL EXAM:  GENERAL: Elderly M alert NAD  NECK: Supple, No JVD, No carotid bruit.  CHEST/LUNG: Clear to auscultation bilaterally; No wheezes, rales, or rhonchi  HEART: S1 S2 normal, RRR,  No murmurs, rubs, or gallops  ABDOMEN: Soft, Nontender, Nondistended; Bowel sounds present  EXTREMITIES:  + LE edema b/l      LABS:                        11.8   8.61  )-----------( 164      ( 25 May 2019 10:38 )             35.6     05-25    138  |  100  |  36<H>  ----------------------------<  110<H>  3.2<L>   |  23  |  1.58<H>    Ca    9.0      25 May 2019 06:39  Mg     2.2     05-25    TPro  7.2  /  Alb  3.4  /  TBili  1.2  /  DBili  x   /  AST  34  /  ALT  23  /  AlkPhos  48  05-24    PT/INR - ( 24 May 2019 15:12 )   PT: 16.4 sec;   INR: 1.41 ratio         PTT - ( 25 May 2019 09:45 )  PTT:47.2 sec  CARDIAC MARKERS ( 25 May 2019 06:39 )  x     / x     / 59 U/L / x     / 2.8 ng/mL  CARDIAC MARKERS ( 24 May 2019 18:25 )  x     / x     / 79 U/L / x     / 2.5 ng/mL      Serum Pro-Brain Natriuretic Peptide: 70923 pg/mL (05-24-19 @ 15:12)          MEDICATIONS  (STANDING):  amLODIPine   Tablet 5 milliGRAM(s) Oral daily  aspirin  chewable 81 milliGRAM(s) Oral daily  carvedilol 25 milliGRAM(s) Oral every 12 hours  citalopram 20 milliGRAM(s) Oral daily  clopidogrel Tablet 75 milliGRAM(s) Oral daily  furosemide   Injectable 40 milliGRAM(s) IV Push two times a day  heparin  Infusion.  Unit(s)/Hr (10 mL/Hr) IV Continuous <Continuous>  levothyroxine 75 MICROGram(s) Oral daily  niacin  milliGRAM(s) Oral at bedtime    MEDICATIONS  (PRN):  ALPRAZolam 0.25 milliGRAM(s) Oral daily PRN anxiety  dicyclomine 20 milliGRAM(s) Oral daily PRN diarrhea  heparin  Injectable 6000 Unit(s) IV Push every 6 hours PRN For aPTT less than 40      RADIOLOGY & ADDITIONAL TESTS:    CXR;  < from: Xray Chest 1 View AP/PA (05.24.19 @ 15:27) >    EXAM:  XR CHEST AP OR PA 1V                            PROCEDURE DATE:  05/24/2019            INTERPRETATION:  Indication: Shortness of breath for one week.    Technique: Single AP view of the chest.    Comparison: None.    Findings: The cardiac silhouette is enlarged. The patient is status post   median sternotomy and CABG. There is a small left pleural effusion. There   is mild pulmonary edema.    Impression: Cardiomegaly. Small left pleural effusion and mild pulmonary   edema.              MOLLY LOWE M.D., ATTENDING RADIOLOGIST  This document has been electronically signed. May 24 2019  3:41PM        < end of copied text >      Cardiology testing:  EKG: NSR, LBBB.    Telemetry: sinus rhythm 50-70's. no events. [facility] Cardiology Consult  _________________________    Patient is a 89y old  Male who presents with a chief complaint of dyspnea (24 May 2019 21:30)      HPI:  89 M h/o CAD s/p remote CABG , LBBB,  HTN, DM, PUD a/w 4 days of HAWKINS, worsening LE edema, nonproductive cough without fevers. He felt shortness of breath when walking across his home. + fatigue.  + orthopnea but no PND. No chest pain or palpitations. In the ED he was given  40 mg IV Lasix with some improvement in symptoms.    PAST MEDICAL & SURGICAL HISTORY:  Stented coronary artery  Hypertension  Diabetes  HTN (hypertension)  CAD (coronary artery disease) of artery bypass graft  S/P CABG (coronary artery bypass graft)  LBBB      MEDICATIONS  (STANDING):  amLODIPine   Tablet 5 milliGRAM(s) Oral daily  aspirin  chewable 81 milliGRAM(s) Oral daily  carvedilol 25 milliGRAM(s) Oral every 12 hours  citalopram 20 milliGRAM(s) Oral daily  clopidogrel Tablet 75 milliGRAM(s) Oral daily  furosemide   Injectable 40 milliGRAM(s) IV Push two times a day  heparin  Infusion.  Unit(s)/Hr (10 mL/Hr) IV Continuous <Continuous>  levothyroxine 75 MICROGram(s) Oral daily  niacin  milliGRAM(s) Oral at bedtime    MEDICATIONS  (PRN):  ALPRAZolam 0.25 milliGRAM(s) Oral daily PRN anxiety  dicyclomine 20 milliGRAM(s) Oral daily PRN diarrhea  heparin  Injectable 6000 Unit(s) IV Push every 6 hours PRN For aPTT less than 40      Allergies    ACE inhibitors (Other)  penicillin (Rash)    Intolerances        Social Histroy: Tobacco- , ETOH-, Illicit Drugs-    T(C): 36.9 (05-25-19 @ 04:00), Max: 37 (05-24-19 @ 14:26)  HR: 68 (05-25-19 @ 09:07) (58 - 68)  BP: 161/78 (05-25-19 @ 05:51) (131/62 - 161/78)  RR: 18 (05-25-19 @ 04:00) (18 - 97)  SpO2: 96% (05-25-19 @ 09:07) (90% - 98%)  I&O's Summary    24 May 2019 07:01  -  25 May 2019 07:00  --------------------------------------------------------  IN: 300 mL / OUT: 920 mL / NET: -620 mL    25 May 2019 07:01  -  25 May 2019 11:23  --------------------------------------------------------  IN: 360 mL / OUT: 0 mL / NET: 360 mL        Review of Systems:  Constitutional: [ ] Fever [ ] Chills [ ] Fatigue [ ] Weight change   HEENT: [ ] Blurred vision [ ] Eye Pain [ ] Headache [ ] Runny nose [ ] Sore Throat   Respiratory: [ ] Cough [ ] Wheezing [ ] Shortness of breath  Cardiovascular: [ ] Chest Pain [ ] Palpitations [x ] HAWKINS [ ] PND [x ] Orthopnea  Gastrointestinal: [ ] Abdominal Pain [ ] Diarrhea [ ] Constipation [ ] Hemorrhoids [ ] Nausea [ ] Vomiting  Genitourinary: [ ] Nocturia [ ] Dysuria [ ] Incontinence  Extremities: [x ] Swelling [ ] Joint Pain  Neurologic: [ ] Focal deficit [ ] Paresthesias [ ] Syncope  Lymphatic: [ ] Swelling [ ] Lymphadenopathy   Skin: [ ] Rash [ ] Ecchymoses [ ] Wounds [ ] Lesions  Psychiatry: [ ] Depression [ ] Suicidal/Homicidal Ideation [ ] Anxiety [ ] Sleep Disturbances  [x ] 10 point review of systems is otherwise negative except as mentioned above            [ ]Unable to obtain    PHYSICAL EXAM:  GENERAL: Elderly M alert NAD  NECK: Supple, No JVD, No carotid bruit.  CHEST/LUNG: Clear to auscultation bilaterally; No wheezes, rales, or rhonchi  HEART: S1 S2 normal, RRR,  No murmurs, rubs, or gallops  ABDOMEN: Soft, Nontender, Nondistended; Bowel sounds present  EXTREMITIES:  + LE edema b/l      LABS:                        11.8   8.61  )-----------( 164      ( 25 May 2019 10:38 )             35.6     05-25    138  |  100  |  36<H>  ----------------------------<  110<H>  3.2<L>   |  23  |  1.58<H>    Ca    9.0      25 May 2019 06:39  Mg     2.2     05-25    TPro  7.2  /  Alb  3.4  /  TBili  1.2  /  DBili  x   /  AST  34  /  ALT  23  /  AlkPhos  48  05-24    PT/INR - ( 24 May 2019 15:12 )   PT: 16.4 sec;   INR: 1.41 ratio         PTT - ( 25 May 2019 09:45 )  PTT:47.2 sec  CARDIAC MARKERS ( 25 May 2019 06:39 )  x     / x     / 59 U/L / x     / 2.8 ng/mL  CARDIAC MARKERS ( 24 May 2019 18:25 )  x     / x     / 79 U/L / x     / 2.5 ng/mL      Serum Pro-Brain Natriuretic Peptide: 28322 pg/mL (05-24-19 @ 15:12)          MEDICATIONS  (STANDING):  amLODIPine   Tablet 5 milliGRAM(s) Oral daily  aspirin  chewable 81 milliGRAM(s) Oral daily  carvedilol 25 milliGRAM(s) Oral every 12 hours  citalopram 20 milliGRAM(s) Oral daily  clopidogrel Tablet 75 milliGRAM(s) Oral daily  furosemide   Injectable 40 milliGRAM(s) IV Push two times a day  heparin  Infusion.  Unit(s)/Hr (10 mL/Hr) IV Continuous <Continuous>  levothyroxine 75 MICROGram(s) Oral daily  niacin  milliGRAM(s) Oral at bedtime    MEDICATIONS  (PRN):  ALPRAZolam 0.25 milliGRAM(s) Oral daily PRN anxiety  dicyclomine 20 milliGRAM(s) Oral daily PRN diarrhea  heparin  Injectable 6000 Unit(s) IV Push every 6 hours PRN For aPTT less than 40      RADIOLOGY & ADDITIONAL TESTS:    CXR;  < from: Xray Chest 1 View AP/PA (05.24.19 @ 15:27) >    EXAM:  XR CHEST AP OR PA 1V                            PROCEDURE DATE:  05/24/2019            INTERPRETATION:  Indication: Shortness of breath for one week.    Technique: Single AP view of the chest.    Comparison: None.    Findings: The cardiac silhouette is enlarged. The patient is status post   median sternotomy and CABG. There is a small left pleural effusion. There   is mild pulmonary edema.    Impression: Cardiomegaly. Small left pleural effusion and mild pulmonary   edema.              MOLLY LOWE M.D., ATTENDING RADIOLOGIST  This document has been electronically signed. May 24 2019  3:41PM        < end of copied text >      Cardiology testing:  EKG: NSR, first deg AVB, LBBB, PVC.    Telemetry: sinus rhythm 50-70's. no events. [facility] Cardiology Consult  _________________________    Patient is a 89y old  Male who presents with a chief complaint of dyspnea (24 May 2019 21:30)      HPI:  89 M h/o CAD s/p remote CABG , LBBB,  HTN, DM, PUD, Aortic stenosis, hypothyroid a/w 4 days of HAWKINS, worsening LE edema, nonproductive cough without fevers. He felt shortness of breath when walking across his home. + fatigue.  + orthopnea but no PND. No chest pain or palpitations. In the ED he was given  40 mg IV Lasix with some improvement in symptoms.    PAST MEDICAL & SURGICAL HISTORY:  Stented coronary artery  Hypertension  Diabetes  HTN (hypertension)  CAD (coronary artery disease) of artery bypass graft  S/P CABG (coronary artery bypass graft)  LBBB  Aortic stenosis  hypothyroid      MEDICATIONS  (STANDING):  amLODIPine   Tablet 5 milliGRAM(s) Oral daily  aspirin  chewable 81 milliGRAM(s) Oral daily  carvedilol 25 milliGRAM(s) Oral every 12 hours  citalopram 20 milliGRAM(s) Oral daily  clopidogrel Tablet 75 milliGRAM(s) Oral daily  furosemide   Injectable 40 milliGRAM(s) IV Push two times a day  heparin  Infusion.  Unit(s)/Hr (10 mL/Hr) IV Continuous <Continuous>  levothyroxine 75 MICROGram(s) Oral daily  niacin  milliGRAM(s) Oral at bedtime    MEDICATIONS  (PRN):  ALPRAZolam 0.25 milliGRAM(s) Oral daily PRN anxiety  dicyclomine 20 milliGRAM(s) Oral daily PRN diarrhea  heparin  Injectable 6000 Unit(s) IV Push every 6 hours PRN For aPTT less than 40      Allergies    ACE inhibitors (Other)  penicillin (Rash)    Intolerances        Social Histroy: Tobacco- , ETOH-, Illicit Drugs-    T(C): 36.9 (05-25-19 @ 04:00), Max: 37 (05-24-19 @ 14:26)  HR: 68 (05-25-19 @ 09:07) (58 - 68)  BP: 161/78 (05-25-19 @ 05:51) (131/62 - 161/78)  RR: 18 (05-25-19 @ 04:00) (18 - 97)  SpO2: 96% (05-25-19 @ 09:07) (90% - 98%)  I&O's Summary    24 May 2019 07:01  -  25 May 2019 07:00  --------------------------------------------------------  IN: 300 mL / OUT: 920 mL / NET: -620 mL    25 May 2019 07:01  -  25 May 2019 11:23  --------------------------------------------------------  IN: 360 mL / OUT: 0 mL / NET: 360 mL        Review of Systems:  Constitutional: [ ] Fever [ ] Chills [ ] Fatigue [ ] Weight change   HEENT: [ ] Blurred vision [ ] Eye Pain [ ] Headache [ ] Runny nose [ ] Sore Throat   Respiratory: [ ] Cough [ ] Wheezing [ ] Shortness of breath  Cardiovascular: [ ] Chest Pain [ ] Palpitations [x ] HAWKINS [ ] PND [x ] Orthopnea  Gastrointestinal: [ ] Abdominal Pain [ ] Diarrhea [ ] Constipation [ ] Hemorrhoids [ ] Nausea [ ] Vomiting  Genitourinary: [ ] Nocturia [ ] Dysuria [ ] Incontinence  Extremities: [x ] Swelling [ ] Joint Pain  Neurologic: [ ] Focal deficit [ ] Paresthesias [ ] Syncope  Lymphatic: [ ] Swelling [ ] Lymphadenopathy   Skin: [ ] Rash [ ] Ecchymoses [ ] Wounds [ ] Lesions  Psychiatry: [ ] Depression [ ] Suicidal/Homicidal Ideation [ ] Anxiety [ ] Sleep Disturbances  [x ] 10 point review of systems is otherwise negative except as mentioned above            [ ]Unable to obtain    PHYSICAL EXAM:  GENERAL: Elderly M alert NAD  NECK: Supple, + JVD  CHEST/LUNG: Clear to auscultation bilaterally; No wheezes, rales, or rhonchi  HEART: S1 S2 normal, RRR, 3/6 late peaking systolic cresc decres murmur at base radiating to carotids.  ABDOMEN: Soft, Nontender, Nondistended; Bowel sounds present  EXTREMITIES:  2+ pitting LE edema b/l with stasis changes.      LABS:                        11.8   8.61  )-----------( 164      ( 25 May 2019 10:38 )             35.6     05-25    138  |  100  |  36<H>  ----------------------------<  110<H>  3.2<L>   |  23  |  1.58<H>    Ca    9.0      25 May 2019 06:39  Mg     2.2     05-25    TPro  7.2  /  Alb  3.4  /  TBili  1.2  /  DBili  x   /  AST  34  /  ALT  23  /  AlkPhos  48  05-24    PT/INR - ( 24 May 2019 15:12 )   PT: 16.4 sec;   INR: 1.41 ratio         PTT - ( 25 May 2019 09:45 )  PTT:47.2 sec  CARDIAC MARKERS ( 25 May 2019 06:39 )  x     / x     / 59 U/L / x     / 2.8 ng/mL  CARDIAC MARKERS ( 24 May 2019 18:25 )  x     / x     / 79 U/L / x     / 2.5 ng/mL      Serum Pro-Brain Natriuretic Peptide: 73995 pg/mL (05-24-19 @ 15:12)          MEDICATIONS  (STANDING):  amLODIPine   Tablet 5 milliGRAM(s) Oral daily  aspirin  chewable 81 milliGRAM(s) Oral daily  carvedilol 25 milliGRAM(s) Oral every 12 hours  citalopram 20 milliGRAM(s) Oral daily  clopidogrel Tablet 75 milliGRAM(s) Oral daily  furosemide   Injectable 40 milliGRAM(s) IV Push two times a day  heparin  Infusion.  Unit(s)/Hr (10 mL/Hr) IV Continuous <Continuous>  levothyroxine 75 MICROGram(s) Oral daily  niacin  milliGRAM(s) Oral at bedtime    MEDICATIONS  (PRN):  ALPRAZolam 0.25 milliGRAM(s) Oral daily PRN anxiety  dicyclomine 20 milliGRAM(s) Oral daily PRN diarrhea  heparin  Injectable 6000 Unit(s) IV Push every 6 hours PRN For aPTT less than 40      RADIOLOGY & ADDITIONAL TESTS:    CXR;  < from: Xray Chest 1 View AP/PA (05.24.19 @ 15:27) >    EXAM:  XR CHEST AP OR PA 1V                            PROCEDURE DATE:  05/24/2019            INTERPRETATION:  Indication: Shortness of breath for one week.    Technique: Single AP view of the chest.    Comparison: None.    Findings: The cardiac silhouette is enlarged. The patient is status post   median sternotomy and CABG. There is a small left pleural effusion. There   is mild pulmonary edema.    Impression: Cardiomegaly. Small left pleural effusion and mild pulmonary   edema.              MOLLY LOWE M.D., ATTENDING RADIOLOGIST  This document has been electronically signed. May 24 2019  3:41PM        < end of copied text >      Cardiology testing:  EKG: NSR, first deg AVB, LBBB, PVC.    Telemetry: sinus rhythm 50-70's. no events. 117

## 2019-05-25 NOTE — CONSULT NOTE ADULT - PROBLEM SELECTOR RECOMMENDATION 9
-  - cont lasix 40 mg IVP BID.  - measure I/O and weights daily.   - echo to eval LV systolic and diastolic function, valves.

## 2019-05-25 NOTE — CONSULT NOTE ADULT - PROBLEM SELECTOR RECOMMENDATION 2
-  - hstroponins elevated.  CPK's are normal.  - likely supply-demand mismatch in setting of CKD, decompensated HF,  known coronary disease.  - doubt ACS.  EKG uninterpretable for ischemic change due to LBBB.  - echo as noted above for LV function.  - cont stop heparin drip.  - cont antiplatelets -  - hstroponins elevated.  CPK's are normal.  - likely supply-demand mismatch in setting of CKD, decompensated HF,  known coronary disease.  - doubt ACS.  EKG uninterpretable for ischemic change due to LBBB, which is reportedly old per cardiology fellow note.  - echo as noted above for LV function.  - cont stop heparin drip.  - cont antiplatelets -  - hstroponins elevated.  CPK's are normal.  - likely supply-demand mismatch in setting of CKD, valve disease, decompensated HF,  known coronary disease.  - doubt ACS.  EKG uninterpretable for ischemic change due to LBBB, which is reportedly old per cardiology fellow note.  - echo as noted above for LV function.  - cont stop heparin drip.  - cont antiplatelets -  - hstroponins elevated.  CPK's are normal.  - likely supply-demand mismatch in setting of CKD, valve disease, decompensated HF,  known coronary disease.  - doubt ACS.  EKG uninterpretable for ischemic change due to LBBB, which is reportedly old per cardiology fellow note.  - echo as noted above for LV function.  - cont antiplatelets

## 2019-05-26 LAB
ANION GAP SERPL CALC-SCNC: 13 MMOL/L — SIGNIFICANT CHANGE UP (ref 5–17)
APTT BLD: 56.6 SEC — HIGH (ref 27.5–36.3)
BUN SERPL-MCNC: 42 MG/DL — HIGH (ref 7–23)
CALCIUM SERPL-MCNC: 9.1 MG/DL — SIGNIFICANT CHANGE UP (ref 8.4–10.5)
CHLORIDE SERPL-SCNC: 100 MMOL/L — SIGNIFICANT CHANGE UP (ref 96–108)
CO2 SERPL-SCNC: 26 MMOL/L — SIGNIFICANT CHANGE UP (ref 22–31)
CREAT SERPL-MCNC: 1.65 MG/DL — HIGH (ref 0.5–1.3)
GLUCOSE SERPL-MCNC: 112 MG/DL — HIGH (ref 70–99)
HCT VFR BLD CALC: 35.7 % — LOW (ref 39–50)
HGB BLD-MCNC: 11.5 G/DL — LOW (ref 13–17)
MCHC RBC-ENTMCNC: 31.1 PG — SIGNIFICANT CHANGE UP (ref 27–34)
MCHC RBC-ENTMCNC: 32.2 GM/DL — SIGNIFICANT CHANGE UP (ref 32–36)
MCV RBC AUTO: 96.5 FL — SIGNIFICANT CHANGE UP (ref 80–100)
PLATELET # BLD AUTO: 182 K/UL — SIGNIFICANT CHANGE UP (ref 150–400)
POTASSIUM SERPL-MCNC: 3.3 MMOL/L — LOW (ref 3.5–5.3)
POTASSIUM SERPL-SCNC: 3.3 MMOL/L — LOW (ref 3.5–5.3)
RBC # BLD: 3.7 M/UL — LOW (ref 4.2–5.8)
RBC # FLD: 14.1 % — SIGNIFICANT CHANGE UP (ref 10.3–14.5)
SODIUM SERPL-SCNC: 139 MMOL/L — SIGNIFICANT CHANGE UP (ref 135–145)
WBC # BLD: 9.46 K/UL — SIGNIFICANT CHANGE UP (ref 3.8–10.5)
WBC # FLD AUTO: 9.46 K/UL — SIGNIFICANT CHANGE UP (ref 3.8–10.5)

## 2019-05-26 RX ORDER — POTASSIUM CHLORIDE 20 MEQ
40 PACKET (EA) ORAL EVERY 4 HOURS
Refills: 0 | Status: COMPLETED | OUTPATIENT
Start: 2019-05-26 | End: 2019-05-26

## 2019-05-26 RX ADMIN — Medication 40 MILLIEQUIVALENT(S): at 12:31

## 2019-05-26 RX ADMIN — CARVEDILOL PHOSPHATE 25 MILLIGRAM(S): 80 CAPSULE, EXTENDED RELEASE ORAL at 05:52

## 2019-05-26 RX ADMIN — CLOPIDOGREL BISULFATE 75 MILLIGRAM(S): 75 TABLET, FILM COATED ORAL at 12:32

## 2019-05-26 RX ADMIN — HEPARIN SODIUM 1400 UNIT(S)/HR: 5000 INJECTION INTRAVENOUS; SUBCUTANEOUS at 00:23

## 2019-05-26 RX ADMIN — Medication 40 MILLIEQUIVALENT(S): at 17:39

## 2019-05-26 RX ADMIN — Medication 40 MILLIGRAM(S): at 17:39

## 2019-05-26 RX ADMIN — Medication 500 MILLIGRAM(S): at 21:02

## 2019-05-26 RX ADMIN — AMLODIPINE BESYLATE 5 MILLIGRAM(S): 2.5 TABLET ORAL at 05:52

## 2019-05-26 RX ADMIN — Medication 75 MICROGRAM(S): at 05:52

## 2019-05-26 RX ADMIN — CITALOPRAM 20 MILLIGRAM(S): 10 TABLET, FILM COATED ORAL at 12:31

## 2019-05-26 RX ADMIN — Medication 81 MILLIGRAM(S): at 12:31

## 2019-05-26 RX ADMIN — Medication 40 MILLIGRAM(S): at 05:52

## 2019-05-26 RX ADMIN — CARVEDILOL PHOSPHATE 25 MILLIGRAM(S): 80 CAPSULE, EXTENDED RELEASE ORAL at 17:39

## 2019-05-27 LAB
ANION GAP SERPL CALC-SCNC: 14 MMOL/L — SIGNIFICANT CHANGE UP (ref 5–17)
APTT BLD: 65 SEC — HIGH (ref 27.5–36.3)
BUN SERPL-MCNC: 43 MG/DL — HIGH (ref 7–23)
CALCIUM SERPL-MCNC: 9.1 MG/DL — SIGNIFICANT CHANGE UP (ref 8.4–10.5)
CHLORIDE SERPL-SCNC: 101 MMOL/L — SIGNIFICANT CHANGE UP (ref 96–108)
CO2 SERPL-SCNC: 27 MMOL/L — SIGNIFICANT CHANGE UP (ref 22–31)
CREAT SERPL-MCNC: 1.59 MG/DL — HIGH (ref 0.5–1.3)
GLUCOSE SERPL-MCNC: 113 MG/DL — HIGH (ref 70–99)
HCT VFR BLD CALC: 36.4 % — LOW (ref 39–50)
HGB BLD-MCNC: 11.8 G/DL — LOW (ref 13–17)
MCHC RBC-ENTMCNC: 31 PG — SIGNIFICANT CHANGE UP (ref 27–34)
MCHC RBC-ENTMCNC: 32.4 GM/DL — SIGNIFICANT CHANGE UP (ref 32–36)
MCV RBC AUTO: 95.5 FL — SIGNIFICANT CHANGE UP (ref 80–100)
PLATELET # BLD AUTO: 195 K/UL — SIGNIFICANT CHANGE UP (ref 150–400)
POTASSIUM SERPL-MCNC: 4.2 MMOL/L — SIGNIFICANT CHANGE UP (ref 3.5–5.3)
POTASSIUM SERPL-SCNC: 4.2 MMOL/L — SIGNIFICANT CHANGE UP (ref 3.5–5.3)
RBC # BLD: 3.81 M/UL — LOW (ref 4.2–5.8)
RBC # FLD: 14 % — SIGNIFICANT CHANGE UP (ref 10.3–14.5)
SODIUM SERPL-SCNC: 142 MMOL/L — SIGNIFICANT CHANGE UP (ref 135–145)
WBC # BLD: 8.21 K/UL — SIGNIFICANT CHANGE UP (ref 3.8–10.5)
WBC # FLD AUTO: 8.21 K/UL — SIGNIFICANT CHANGE UP (ref 3.8–10.5)

## 2019-05-27 RX ORDER — APIXABAN 2.5 MG/1
2.5 TABLET, FILM COATED ORAL EVERY 12 HOURS
Refills: 0 | Status: DISCONTINUED | OUTPATIENT
Start: 2019-05-27 | End: 2019-05-30

## 2019-05-27 RX ADMIN — CARVEDILOL PHOSPHATE 25 MILLIGRAM(S): 80 CAPSULE, EXTENDED RELEASE ORAL at 17:40

## 2019-05-27 RX ADMIN — Medication 40 MILLIGRAM(S): at 17:40

## 2019-05-27 RX ADMIN — APIXABAN 2.5 MILLIGRAM(S): 2.5 TABLET, FILM COATED ORAL at 21:19

## 2019-05-27 RX ADMIN — APIXABAN 2.5 MILLIGRAM(S): 2.5 TABLET, FILM COATED ORAL at 09:29

## 2019-05-27 RX ADMIN — Medication 500 MILLIGRAM(S): at 21:19

## 2019-05-27 RX ADMIN — Medication 81 MILLIGRAM(S): at 11:57

## 2019-05-27 RX ADMIN — Medication 40 MILLIGRAM(S): at 05:45

## 2019-05-27 RX ADMIN — CARVEDILOL PHOSPHATE 25 MILLIGRAM(S): 80 CAPSULE, EXTENDED RELEASE ORAL at 05:45

## 2019-05-27 RX ADMIN — CITALOPRAM 20 MILLIGRAM(S): 10 TABLET, FILM COATED ORAL at 11:57

## 2019-05-27 RX ADMIN — AMLODIPINE BESYLATE 5 MILLIGRAM(S): 2.5 TABLET ORAL at 05:45

## 2019-05-27 RX ADMIN — CLOPIDOGREL BISULFATE 75 MILLIGRAM(S): 75 TABLET, FILM COATED ORAL at 11:57

## 2019-05-27 RX ADMIN — Medication 75 MICROGRAM(S): at 05:45

## 2019-05-28 ENCOUNTER — TRANSCRIPTION ENCOUNTER (OUTPATIENT)
Age: 84
End: 2019-05-28

## 2019-05-28 LAB
ANION GAP SERPL CALC-SCNC: 12 MMOL/L — SIGNIFICANT CHANGE UP (ref 5–17)
BUN SERPL-MCNC: 50 MG/DL — HIGH (ref 7–23)
CALCIUM SERPL-MCNC: 9.3 MG/DL — SIGNIFICANT CHANGE UP (ref 8.4–10.5)
CHLORIDE SERPL-SCNC: 97 MMOL/L — SIGNIFICANT CHANGE UP (ref 96–108)
CO2 SERPL-SCNC: 29 MMOL/L — SIGNIFICANT CHANGE UP (ref 22–31)
CREAT SERPL-MCNC: 1.76 MG/DL — HIGH (ref 0.5–1.3)
GLUCOSE SERPL-MCNC: 109 MG/DL — HIGH (ref 70–99)
HCT VFR BLD CALC: 36.9 % — LOW (ref 39–50)
HGB BLD-MCNC: 12.1 G/DL — LOW (ref 13–17)
MAGNESIUM SERPL-MCNC: 2.1 MG/DL — SIGNIFICANT CHANGE UP (ref 1.6–2.6)
MCHC RBC-ENTMCNC: 31.8 PG — SIGNIFICANT CHANGE UP (ref 27–34)
MCHC RBC-ENTMCNC: 32.9 GM/DL — SIGNIFICANT CHANGE UP (ref 32–36)
MCV RBC AUTO: 96.8 FL — SIGNIFICANT CHANGE UP (ref 80–100)
PLATELET # BLD AUTO: 230 K/UL — SIGNIFICANT CHANGE UP (ref 150–400)
POTASSIUM SERPL-MCNC: 4.1 MMOL/L — SIGNIFICANT CHANGE UP (ref 3.5–5.3)
POTASSIUM SERPL-SCNC: 4.1 MMOL/L — SIGNIFICANT CHANGE UP (ref 3.5–5.3)
RBC # BLD: 3.81 M/UL — LOW (ref 4.2–5.8)
RBC # FLD: 13.1 % — SIGNIFICANT CHANGE UP (ref 10.3–14.5)
SODIUM SERPL-SCNC: 138 MMOL/L — SIGNIFICANT CHANGE UP (ref 135–145)
WBC # BLD: 8.4 K/UL — SIGNIFICANT CHANGE UP (ref 3.8–10.5)
WBC # FLD AUTO: 8.4 K/UL — SIGNIFICANT CHANGE UP (ref 3.8–10.5)

## 2019-05-28 PROCEDURE — 71045 X-RAY EXAM CHEST 1 VIEW: CPT | Mod: 26

## 2019-05-28 PROCEDURE — 93306 TTE W/DOPPLER COMPLETE: CPT | Mod: 26

## 2019-05-28 PROCEDURE — 78582 LUNG VENTILAT&PERFUS IMAGING: CPT | Mod: 26

## 2019-05-28 RX ORDER — FUROSEMIDE 40 MG
40 TABLET ORAL
Refills: 0 | Status: DISCONTINUED | OUTPATIENT
Start: 2019-05-28 | End: 2019-05-29

## 2019-05-28 RX ADMIN — CITALOPRAM 20 MILLIGRAM(S): 10 TABLET, FILM COATED ORAL at 11:00

## 2019-05-28 RX ADMIN — APIXABAN 2.5 MILLIGRAM(S): 2.5 TABLET, FILM COATED ORAL at 21:33

## 2019-05-28 RX ADMIN — Medication 75 MICROGRAM(S): at 05:57

## 2019-05-28 RX ADMIN — CARVEDILOL PHOSPHATE 25 MILLIGRAM(S): 80 CAPSULE, EXTENDED RELEASE ORAL at 05:57

## 2019-05-28 RX ADMIN — Medication 81 MILLIGRAM(S): at 11:00

## 2019-05-28 RX ADMIN — AMLODIPINE BESYLATE 5 MILLIGRAM(S): 2.5 TABLET ORAL at 05:57

## 2019-05-28 RX ADMIN — APIXABAN 2.5 MILLIGRAM(S): 2.5 TABLET, FILM COATED ORAL at 08:10

## 2019-05-28 RX ADMIN — Medication 40 MILLIGRAM(S): at 05:57

## 2019-05-28 RX ADMIN — CARVEDILOL PHOSPHATE 25 MILLIGRAM(S): 80 CAPSULE, EXTENDED RELEASE ORAL at 17:31

## 2019-05-28 RX ADMIN — CLOPIDOGREL BISULFATE 75 MILLIGRAM(S): 75 TABLET, FILM COATED ORAL at 11:00

## 2019-05-28 RX ADMIN — Medication 500 MILLIGRAM(S): at 21:33

## 2019-05-28 RX ADMIN — Medication 40 MILLIGRAM(S): at 17:31

## 2019-05-28 NOTE — DISCHARGE NOTE PROVIDER - PROVIDER TOKENS
FREE:[LAST:[Nelida],FIRST:[María],PHONE:[phone]],FAX:[(   [phone]    -],ADDRESS:[[address] [place] [address]],FOLLOWUP:[1 week]]

## 2019-05-28 NOTE — DISCHARGE NOTE PROVIDER - NSDCCPCAREPLAN_GEN_ALL_CORE_FT
PRINCIPAL DISCHARGE DIAGNOSIS  Diagnosis: SOB (shortness of breath) on exertion  Assessment and Plan of Treatment: Resolved with IV laisx      SECONDARY DISCHARGE DIAGNOSES  Diagnosis: DVT, lower extremity  Assessment and Plan of Treatment: Take your "blood thinners" as prescribed.  Walking is encouraged, increase activity as tolerated.  If you develop new leg pain, swelling, and/or redness contact your healthcare provider.  If you develop new chest pain with difficulty breathing, a rapid heart rate and/or a feeling of passing out call emergency medical services 911.    Diagnosis: Acute CHF  Assessment and Plan of Treatment: Weigh yourself daily.  If you gain 3lbs in 3 days, or 5lbs in a week call your Health Care Provider.  Do not eat or drink foods containing more than 2000mg of salt (sodium) in your diet every day.  Call your Health Care Provider if you have any swelling or increased swelling in your feet, ankles, and/or stomach.  The Pt was provided with CHF diet instruction (low sodium diet, daily weights, label reading, Heart Healthy Cooking Tips & Heart Healthy shopping Tips).  Take all of your medication as directed.  If you become dizzy call your Health Care Provider.    Diagnosis: Acute pulmonary edema  Assessment and Plan of Treatment: Weigh yourself daily.  If you gain 3lbs in 3 days, or 5lbs in a week call your Health Care Provider.  Do not eat or drink foods containing more than 2000mg of salt (sodium) in your diet every day.  Call your Health Care Provider if you have any swelling or increased swelling in your feet, ankles, and/or stomach.  The Pt was provided with CHF diet instruction (low sodium diet, daily weights, label reading, Heart Healthy Cooking Tips & Heart Healthy shopping Tips).  Take all of your medication as directed.  If you become dizzy call your Health Care Provider.

## 2019-05-28 NOTE — DISCHARGE NOTE PROVIDER - HOSPITAL COURSE
89 y/o male PMHx of CAD s/p CABG, complicated by perforated peptic ulcer s/p prolonged hospitalization back in 1990s,  HTN, diabetes (diet controlled, no longer on Metformin, last HgbA1C 6%) presenting with worsening shortness of breath x 4 days PTA, increased LE edema bilaterally.    SOB (shortness of breath) on exertion.  Likely acute CHF with gross volume overload elevated proBNP 81766. CXR with pulm edema.  EKG with chronic LBBB (old EKGs in the chart)    Hypoxic to 90% on room air on admission, placed on 2L nasal canula. Now doing better.     continue Lasix 40 mg IV BID, daily weights, strick I/O. Troponin elevated.  r/o ACS vs. elevated trop in the setting of demand ischemia/CHF/CKD    cardiology consult appreciated.  c/w Coreg 37.5 mg BID, Plavix 75 mg, Asa 81 (received 4 tabs of asa in ED) check TTE for LV function and valvular dysfunction RVP neg. Lower extremity edema.  Plan: LE dopplers shows left femoral vein DVT on Heparin drip, switched to Eliquis PO by card.  V/Q scan ordered. Low probability for PE.  Changed to PO lasix.  D/C to home

## 2019-05-28 NOTE — PROGRESS NOTE ADULT - ATTENDING COMMENTS
- Dr. MIKEL Currie ([facility])  - [phone]
- Dr. MIKEL Currie ([facility])  - [phone]
- Dr. MIKEL Currie ([facility])  - [phone]
Liam Wilde will be covering for me starting 5/29/19. He can be reached at  if needed.     - Dr. MIKEL Currie ([facility])  - [phone]

## 2019-05-28 NOTE — DISCHARGE NOTE PROVIDER - CARE PROVIDER_API CALL
María Krause  [address] [place] [address]  Phone: [phone]  Fax: (   )    -  Follow Up Time: 1 week

## 2019-05-28 NOTE — PROGRESS NOTE ADULT - NSHPATTENDINGPLANDISCUSS_GEN_ALL_CORE
pt
pt, daughter, and the rest of the family at bedside.
pt and card. d/w JOSEFA Goldberg
pt and cardio

## 2019-05-29 ENCOUNTER — TRANSCRIPTION ENCOUNTER (OUTPATIENT)
Age: 84
End: 2019-05-29

## 2019-05-29 LAB
ANION GAP SERPL CALC-SCNC: 14 MMOL/L — SIGNIFICANT CHANGE UP (ref 5–17)
BUN SERPL-MCNC: 61 MG/DL — HIGH (ref 7–23)
CALCIUM SERPL-MCNC: 9.4 MG/DL — SIGNIFICANT CHANGE UP (ref 8.4–10.5)
CHLORIDE SERPL-SCNC: 96 MMOL/L — SIGNIFICANT CHANGE UP (ref 96–108)
CO2 SERPL-SCNC: 30 MMOL/L — SIGNIFICANT CHANGE UP (ref 22–31)
CREAT SERPL-MCNC: 1.85 MG/DL — HIGH (ref 0.5–1.3)
GLUCOSE SERPL-MCNC: 108 MG/DL — HIGH (ref 70–99)
HCT VFR BLD CALC: 36.8 % — LOW (ref 39–50)
HGB BLD-MCNC: 11.9 G/DL — LOW (ref 13–17)
MCHC RBC-ENTMCNC: 30.6 PG — SIGNIFICANT CHANGE UP (ref 27–34)
MCHC RBC-ENTMCNC: 32.3 GM/DL — SIGNIFICANT CHANGE UP (ref 32–36)
MCV RBC AUTO: 94.6 FL — SIGNIFICANT CHANGE UP (ref 80–100)
PLATELET # BLD AUTO: 242 K/UL — SIGNIFICANT CHANGE UP (ref 150–400)
POTASSIUM SERPL-MCNC: 4 MMOL/L — SIGNIFICANT CHANGE UP (ref 3.5–5.3)
POTASSIUM SERPL-SCNC: 4 MMOL/L — SIGNIFICANT CHANGE UP (ref 3.5–5.3)
RBC # BLD: 3.89 M/UL — LOW (ref 4.2–5.8)
RBC # FLD: 14 % — SIGNIFICANT CHANGE UP (ref 10.3–14.5)
SODIUM SERPL-SCNC: 140 MMOL/L — SIGNIFICANT CHANGE UP (ref 135–145)
WBC # BLD: 7.16 K/UL — SIGNIFICANT CHANGE UP (ref 3.8–10.5)
WBC # FLD AUTO: 7.16 K/UL — SIGNIFICANT CHANGE UP (ref 3.8–10.5)

## 2019-05-29 RX ORDER — FUROSEMIDE 40 MG
40 TABLET ORAL DAILY
Refills: 0 | Status: DISCONTINUED | OUTPATIENT
Start: 2019-05-29 | End: 2019-05-30

## 2019-05-29 RX ADMIN — Medication 75 MICROGRAM(S): at 06:43

## 2019-05-29 RX ADMIN — CARVEDILOL PHOSPHATE 25 MILLIGRAM(S): 80 CAPSULE, EXTENDED RELEASE ORAL at 18:50

## 2019-05-29 RX ADMIN — Medication 500 MILLIGRAM(S): at 21:04

## 2019-05-29 RX ADMIN — Medication 81 MILLIGRAM(S): at 12:40

## 2019-05-29 RX ADMIN — CITALOPRAM 20 MILLIGRAM(S): 10 TABLET, FILM COATED ORAL at 12:40

## 2019-05-29 RX ADMIN — AMLODIPINE BESYLATE 5 MILLIGRAM(S): 2.5 TABLET ORAL at 06:43

## 2019-05-29 RX ADMIN — CARVEDILOL PHOSPHATE 25 MILLIGRAM(S): 80 CAPSULE, EXTENDED RELEASE ORAL at 06:43

## 2019-05-29 RX ADMIN — Medication 40 MILLIGRAM(S): at 06:43

## 2019-05-29 RX ADMIN — APIXABAN 2.5 MILLIGRAM(S): 2.5 TABLET, FILM COATED ORAL at 21:04

## 2019-05-29 RX ADMIN — APIXABAN 2.5 MILLIGRAM(S): 2.5 TABLET, FILM COATED ORAL at 10:00

## 2019-05-29 NOTE — PROGRESS NOTE ADULT - PROBLEM SELECTOR PROBLEM 7
IBS (irritable bowel syndrome)

## 2019-05-29 NOTE — PROGRESS NOTE ADULT - PROBLEM SELECTOR PLAN 7
PRN dicyclomine if diarrhea

## 2019-05-29 NOTE — PROGRESS NOTE ADULT - PROBLEM SELECTOR PLAN 8
Cr 1.66 baseline back in april  stable  continue to monitor on diuretics

## 2019-05-29 NOTE — DISCHARGE NOTE NURSING/CASE MANAGEMENT/SOCIAL WORK - NSDCDPATPORTLINK_GEN_ALL_CORE
You can access the [facility] Portal, offered by [facility], by registering with the following website: [email or web address]

## 2019-05-29 NOTE — PROGRESS NOTE ADULT - PROBLEM SELECTOR PROBLEM 8
Chronic kidney disease (CKD), stage III (moderate)

## 2019-05-29 NOTE — PROGRESS NOTE ADULT - PROBLEM SELECTOR PLAN 9
c/w citalopram 20 mg  PRN Xanax 0.25 mg

## 2019-05-30 VITALS
RESPIRATION RATE: 18 BRPM | HEART RATE: 65 BPM | OXYGEN SATURATION: 96 % | DIASTOLIC BLOOD PRESSURE: 56 MMHG | SYSTOLIC BLOOD PRESSURE: 132 MMHG

## 2019-05-30 LAB
ANION GAP SERPL CALC-SCNC: 15 MMOL/L — SIGNIFICANT CHANGE UP (ref 5–17)
BUN SERPL-MCNC: 64 MG/DL — HIGH (ref 7–23)
CALCIUM SERPL-MCNC: 9.1 MG/DL — SIGNIFICANT CHANGE UP (ref 8.4–10.5)
CHLORIDE SERPL-SCNC: 99 MMOL/L — SIGNIFICANT CHANGE UP (ref 96–108)
CO2 SERPL-SCNC: 27 MMOL/L — SIGNIFICANT CHANGE UP (ref 22–31)
CREAT SERPL-MCNC: 1.81 MG/DL — HIGH (ref 0.5–1.3)
GLUCOSE SERPL-MCNC: 101 MG/DL — HIGH (ref 70–99)
HCT VFR BLD CALC: 38 % — LOW (ref 39–50)
HGB BLD-MCNC: 11.7 G/DL — LOW (ref 13–17)
MCHC RBC-ENTMCNC: 30 PG — SIGNIFICANT CHANGE UP (ref 27–34)
MCHC RBC-ENTMCNC: 30.8 GM/DL — LOW (ref 32–36)
MCV RBC AUTO: 97.4 FL — SIGNIFICANT CHANGE UP (ref 80–100)
PLATELET # BLD AUTO: 251 K/UL — SIGNIFICANT CHANGE UP (ref 150–400)
POTASSIUM SERPL-MCNC: 3.8 MMOL/L — SIGNIFICANT CHANGE UP (ref 3.5–5.3)
POTASSIUM SERPL-SCNC: 3.8 MMOL/L — SIGNIFICANT CHANGE UP (ref 3.5–5.3)
RBC # BLD: 3.9 M/UL — LOW (ref 4.2–5.8)
RBC # FLD: 14 % — SIGNIFICANT CHANGE UP (ref 10.3–14.5)
SODIUM SERPL-SCNC: 141 MMOL/L — SIGNIFICANT CHANGE UP (ref 135–145)
WBC # BLD: 7.37 K/UL — SIGNIFICANT CHANGE UP (ref 3.8–10.5)
WBC # FLD AUTO: 7.37 K/UL — SIGNIFICANT CHANGE UP (ref 3.8–10.5)

## 2019-05-30 PROCEDURE — 83735 ASSAY OF MAGNESIUM: CPT

## 2019-05-30 PROCEDURE — 93005 ELECTROCARDIOGRAM TRACING: CPT

## 2019-05-30 PROCEDURE — 82553 CREATINE MB FRACTION: CPT

## 2019-05-30 PROCEDURE — 87798 DETECT AGENT NOS DNA AMP: CPT

## 2019-05-30 PROCEDURE — 85610 PROTHROMBIN TIME: CPT

## 2019-05-30 PROCEDURE — A9567: CPT

## 2019-05-30 PROCEDURE — 82435 ASSAY OF BLOOD CHLORIDE: CPT

## 2019-05-30 PROCEDURE — 84484 ASSAY OF TROPONIN QUANT: CPT

## 2019-05-30 PROCEDURE — 84132 ASSAY OF SERUM POTASSIUM: CPT

## 2019-05-30 PROCEDURE — 82550 ASSAY OF CK (CPK): CPT

## 2019-05-30 PROCEDURE — A9540: CPT

## 2019-05-30 PROCEDURE — 83880 ASSAY OF NATRIURETIC PEPTIDE: CPT

## 2019-05-30 PROCEDURE — 71045 X-RAY EXAM CHEST 1 VIEW: CPT

## 2019-05-30 PROCEDURE — 93306 TTE W/DOPPLER COMPLETE: CPT

## 2019-05-30 PROCEDURE — 80053 COMPREHEN METABOLIC PANEL: CPT

## 2019-05-30 PROCEDURE — 80061 LIPID PANEL: CPT

## 2019-05-30 PROCEDURE — 83605 ASSAY OF LACTIC ACID: CPT

## 2019-05-30 PROCEDURE — 84295 ASSAY OF SERUM SODIUM: CPT

## 2019-05-30 PROCEDURE — 87581 M.PNEUMON DNA AMP PROBE: CPT

## 2019-05-30 PROCEDURE — 87633 RESP VIRUS 12-25 TARGETS: CPT

## 2019-05-30 PROCEDURE — 93970 EXTREMITY STUDY: CPT

## 2019-05-30 PROCEDURE — 94660 CPAP INITIATION&MGMT: CPT

## 2019-05-30 PROCEDURE — 85730 THROMBOPLASTIN TIME PARTIAL: CPT

## 2019-05-30 PROCEDURE — 87486 CHLMYD PNEUM DNA AMP PROBE: CPT

## 2019-05-30 PROCEDURE — 82803 BLOOD GASES ANY COMBINATION: CPT

## 2019-05-30 PROCEDURE — 99285 EMERGENCY DEPT VISIT HI MDM: CPT | Mod: 25

## 2019-05-30 PROCEDURE — 85014 HEMATOCRIT: CPT

## 2019-05-30 PROCEDURE — 84443 ASSAY THYROID STIM HORMONE: CPT

## 2019-05-30 PROCEDURE — 82330 ASSAY OF CALCIUM: CPT

## 2019-05-30 PROCEDURE — 82947 ASSAY GLUCOSE BLOOD QUANT: CPT

## 2019-05-30 PROCEDURE — 83036 HEMOGLOBIN GLYCOSYLATED A1C: CPT

## 2019-05-30 PROCEDURE — 96374 THER/PROPH/DIAG INJ IV PUSH: CPT

## 2019-05-30 PROCEDURE — 80048 BASIC METABOLIC PNL TOTAL CA: CPT

## 2019-05-30 PROCEDURE — 85027 COMPLETE CBC AUTOMATED: CPT

## 2019-05-30 PROCEDURE — 78582 LUNG VENTILAT&PERFUS IMAGING: CPT

## 2019-05-30 RX ORDER — APIXABAN 2.5 MG/1
1 TABLET, FILM COATED ORAL
Qty: 60 | Refills: 0
Start: 2019-05-30 | End: 2019-06-28

## 2019-05-30 RX ORDER — FUROSEMIDE 40 MG
1 TABLET ORAL
Qty: 30 | Refills: 0
Start: 2019-05-30 | End: 2019-06-28

## 2019-05-30 RX ADMIN — Medication 75 MICROGRAM(S): at 05:23

## 2019-05-30 RX ADMIN — CITALOPRAM 20 MILLIGRAM(S): 10 TABLET, FILM COATED ORAL at 12:45

## 2019-05-30 RX ADMIN — CARVEDILOL PHOSPHATE 25 MILLIGRAM(S): 80 CAPSULE, EXTENDED RELEASE ORAL at 05:23

## 2019-05-30 RX ADMIN — AMLODIPINE BESYLATE 5 MILLIGRAM(S): 2.5 TABLET ORAL at 05:23

## 2019-05-30 RX ADMIN — Medication 81 MILLIGRAM(S): at 12:45

## 2019-05-30 RX ADMIN — APIXABAN 2.5 MILLIGRAM(S): 2.5 TABLET, FILM COATED ORAL at 08:34

## 2019-05-30 RX ADMIN — Medication 40 MILLIGRAM(S): at 05:23

## 2019-05-30 NOTE — PROGRESS NOTE ADULT - ASSESSMENT
89 M h/o CAD s/p remote CABG , LBBB, Aortic stenosis, hypothyroid,  HTN, DM, PUD a/w acute heart failure and noted to have elevated troponins.
89 M h/o CAD s/p remote CABG , LBBB, Aortic stenosis, hypothyroid,  HTN, DM, PUD a/w acute heart failure and noted to have elevated troponins.
89 M h/o CAD s/p remote CABG , LBBB, Aortic stenosis, hypothyroid,  HTN, DM, PUD a/w acute heart failure and noted to have elevated troponins.    TTE  1. Mitral annular calcification with calcified chordae.  Calcified and tethered mitral valve leaflets. Mild mitral  regurgitation.  2. Aortic valve not well visualized; appears calcified with  decreased opening. Peak transaortic valve gradient equals  19 mm Hg, mean transaortic valve gradient equals 11 mm Hg,  estimated aortic valve area equals 1.3 sqcm (by continuity  equation), aortic valve velocity time integral equals 55  cm, consistent with low gradient moderate aortic stenosis.  Minimal aortic regurgitation.  3. Endocardium not well visualized; moderate left  ventricular systolic dysfunction. The inferolateral and  lateral walls are hypokinetic. Paradoxical septal motion  consistent with conduction defect.  4. Mild diastolic dysfunction (Stage I).  5. The right ventricle is not well visualized; grossly  normal right ventricular size with decreased systolic  function.
89 M h/o CAD s/p remote CABG , LBBB, Aortic stenosis, hypothyroid,  HTN, DM, PUD a/w acute heart failure and noted to have elevated troponins.    TTE  1. Mitral annular calcification with calcified chordae.  Calcified and tethered mitral valve leaflets. Mild mitral  regurgitation.  2. Aortic valve not well visualized; appears calcified with  decreased opening. Peak transaortic valve gradient equals  19 mm Hg, mean transaortic valve gradient equals 11 mm Hg,  estimated aortic valve area equals 1.3 sqcm (by continuity  equation), aortic valve velocity time integral equals 55  cm, consistent with low gradient moderate aortic stenosis.  Minimal aortic regurgitation.  3. Endocardium not well visualized; moderate left  ventricular systolic dysfunction. The inferolateral and  lateral walls are hypokinetic. Paradoxical septal motion  consistent with conduction defect.  4. Mild diastolic dysfunction (Stage I).  5. The right ventricle is not well visualized; grossly  normal right ventricular size with decreased systolic  function.
89 y/o male PMHx of CAD s/p CABG, complicated by perforated peptic ucler s/p prolonged hospitalization back in 1990s,  HTN, diabetes (diet controlled, no longer on Metformin, last HgbA1C 6%) presenting with worsening shortness of breath x 4 days, increased LE edema bilaterally.
89 y/o male PMHx of CAD s/p CABG, complicated by perforated peptic ucler s/p prolonged hospitalization back in 1990s,  HTN, diabetes (diet controlled, no longer on Metformin, last HgbA1C 6%) presenting with worsening shortness of breath x 4 days, increased LE edema bilaterally.
89 y/o male PMHx of CAD s/p CABG, complicated by perforated peptic ucler s/p prolonged hospitalization back in 1990s,  HTN, diabetes (diet controlled, no longer on Metformin, last HgbA1C 6%) presenting with worsening shortness of breath x 4 days, increased LE edema bilaterally.
89 y/o male PMHx of CAD s/p CABG, complicated by perforated peptic ucler s/p prolonged hospitalization back in 1990s,  HTN, diabetes (diet controlled, no longer on Metformin, last HgbA1C 6%) presenting with worsening shortness of breath x 4 days, increased LE edema bilaterally.
89 y/o male PMHx of CAD s/p CABG, complicated by perforated peptic ucler s/p prolonged hospitalization back in 1990s,  HTN, diabetes (diet controlled, no longer on Metformin, last HgbA1C 6%) presenting with worsening shortness of breath x 4 days, increased LE edema bilaterally.

## 2019-05-30 NOTE — PROGRESS NOTE ADULT - PROBLEM SELECTOR PROBLEM 3
Diabetes
Coronary artery disease involving coronary bypass graft of native heart without angina pectoris
Diabetes
Diabetes

## 2019-05-30 NOTE — PROVIDER CONTACT NOTE (OTHER) - ACTION/TREATMENT ORDERED:
NP put in a provider to RN to keep iv in till tomorrow when the patient gets discharged.
no intervention at this time. pt to be d/c home.

## 2019-05-30 NOTE — PROGRESS NOTE ADULT - PROBLEM SELECTOR PROBLEM 5
CAD (coronary artery disease) of artery bypass graft
Acute deep vein thrombosis (DVT) of femoral vein of left lower extremity
CAD (coronary artery disease) of artery bypass graft
CAD (coronary artery disease) of artery bypass graft

## 2019-05-30 NOTE — PROGRESS NOTE ADULT - PROBLEM SELECTOR PLAN 5
s/p CABG  see above
-  - Can switch to eliquis 2.5 mg bid.  - echo to eval LV/RV function.  V/Q scan to r/o PE as well.  - pt hemodynamically stable.    our team to follow.    Ramsey Browne D.O.  [phone]
-  - Eliquis 2.5 mg bid.  - V/Q scan negative.  - pt hemodynamically stable.    ?d/c planning. will need outpatient bmp follow up.   Also will need to follow up with his cardiologist as outpatient in 2-3 weeks.     Ramsey Browne D.O.  [phone]
-  - Eliquis 2.5 mg bid.  - V/Q scan negative.  - pt hemodynamically stable.    d/c planning. will need outpatient bmp follow up.   Also will need to follow up with his cardiologist as outpatient in 2-3 weeks.     Ramsey Browne D.O.  [phone]
-  - on heparin gtt.  - echo to eval LV/RV function.  Consider V/Q scan to r/o PE as well.  - pt hemodynamically stable.    our team to follow.    John Chung M.D., [facility]  [phone]
s/p CABG  see above
s/p CABG  see above

## 2019-05-30 NOTE — PROGRESS NOTE ADULT - PROBLEM SELECTOR PROBLEM 1
Acute heart failure, unspecified heart failure type
SOB (shortness of breath) on exertion

## 2019-05-30 NOTE — PROVIDER CONTACT NOTE (OTHER) - REASON
4 beats WCT on telemetry
Pt does not want a new iv put in- pt is not on iv lasix anymore- now on PO- pt will be d/c tomorrow

## 2019-05-30 NOTE — PROGRESS NOTE ADULT - PROBLEM SELECTOR PROBLEM 2
Lower extremity edema
Troponin I above reference range
Lower extremity edema

## 2019-05-30 NOTE — PROVIDER CONTACT NOTE (OTHER) - ASSESSMENT
pt. A&Ox4. VSS. responsive. asymptomatic. no complaints voiced.
Pt AOx4. PT is not receiving any iv medications through the iv. Pt is having no signs and symptoms of infiltration, swelling.

## 2019-05-30 NOTE — PROVIDER CONTACT NOTE (OTHER) - BACKGROUND
pt. admitted with acute pulmonary edema, elevated troponin, LLE DVT. pmh CAD, HTN
PT came in with acute pulmonary edema and SOB. Pt has a hx of DM, HTN, CAD.

## 2019-05-30 NOTE — PROGRESS NOTE ADULT - SUBJECTIVE AND OBJECTIVE BOX
[facility] Cardiology Progress Note  _______________________________    Pt. seen and examined. No new cardiac-related complaints.    Telemetry -sinus 70-80s    T(C): 36.8 (05-30-19 @ 04:34), Max: 36.8 (05-30-19 @ 04:34)  HR: 67 (05-30-19 @ 04:34) (57 - 69)  BP: 144/69 (05-30-19 @ 04:34) (117/54 - 144/69)  RR: 18 (05-30-19 @ 04:34) (17 - 18)  SpO2: 98% (05-30-19 @ 04:34) (92% - 98%)  I&O's Summary    29 May 2019 07:01  -  30 May 2019 07:00  --------------------------------------------------------  IN: 950 mL / OUT: 350 mL / NET: 600 mL    30 May 2019 07:01  -  30 May 2019 09:27  --------------------------------------------------------  IN: 260 mL / OUT: 300 mL / NET: -40 mL        PHYSICAL EXAM:  GENERAL: Alert, NAD.  NECK: Supple  CHEST/LUNG: crackles bibasilarly.  HEART: S1 S2 normal, RRR; No murmurs, rubs, or gallops  ABDOMEN: Soft,Nondistended  EXTREMITIES:  No LE edema.      LABS:                        11.7   7.37  )-----------( 251      ( 30 May 2019 08:08 )             38.0     05-30    141  |  99  |  64<H>  ----------------------------<  101<H>  3.8   |  27  |  1.81<H>    Ca    9.1      30 May 2019 06:20        CARDIAC MARKERS ( 25 May 2019 06:39 )  x     / x     / 59 U/L / x     / 2.8 ng/mL  CARDIAC MARKERS ( 24 May 2019 18:25 )  x     / x     / 79 U/L / x     / 2.5 ng/mL              MEDICATIONS  (STANDING):  amLODIPine   Tablet 5 milliGRAM(s) Oral daily  apixaban 2.5 milliGRAM(s) Oral every 12 hours  aspirin  chewable 81 milliGRAM(s) Oral daily  carvedilol 25 milliGRAM(s) Oral every 12 hours  citalopram 20 milliGRAM(s) Oral daily  furosemide    Tablet 40 milliGRAM(s) Oral daily  levothyroxine 75 MICROGram(s) Oral daily  niacin  milliGRAM(s) Oral at bedtime    MEDICATIONS  (PRN):  ALPRAZolam 0.25 milliGRAM(s) Oral daily PRN anxiety  dicyclomine 20 milliGRAM(s) Oral daily PRN diarrhea        RADIOLOGY & ADDITIONAL TESTS:
[facility] Cardiology Progress Note  _______________________________    Pt. seen and examined. No new cardiac-related complaints.    Telemetry -sinus 60-70s, pvcs,     T(C): 36.5 (05-29-19 @ 04:17), Max: 36.8 (05-28-19 @ 12:21)  HR: 67 (05-29-19 @ 09:32) (65 - 71)  BP: 131/68 (05-29-19 @ 04:17) (116/52 - 131/68)  RR: 18 (05-29-19 @ 04:17) (18 - 18)  SpO2: 92% (05-29-19 @ 09:32) (92% - 98%)  I&O's Summary    28 May 2019 07:01  -  29 May 2019 07:00  --------------------------------------------------------  IN: 880 mL / OUT: 1180 mL / NET: -300 mL    29 May 2019 07:01  -  29 May 2019 10:21  --------------------------------------------------------  IN: 250 mL / OUT: 0 mL / NET: 250 mL        PHYSICAL EXAM:  GENERAL: Alert, NAD.  NECK: Supple  CHEST/LUNG: Crackles bibasilarly.  HEART: S1 S2 normal, RRR; systolic ejection murmur  ABDOMEN: Soft, Nondistended  EXTREMITIES:  No LE edema.      LABS:                        11.9   7.16  )-----------( 242      ( 29 May 2019 08:39 )             36.8     05-29    140  |  96  |  61<H>  ----------------------------<  108<H>  4.0   |  30  |  1.85<H>    Ca    9.4      29 May 2019 06:21  Mg     2.1     05-28        CARDIAC MARKERS ( 25 May 2019 06:39 )  x     / x     / 59 U/L / x     / 2.8 ng/mL  CARDIAC MARKERS ( 24 May 2019 18:25 )  x     / x     / 79 U/L / x     / 2.5 ng/mL              MEDICATIONS  (STANDING):  amLODIPine   Tablet 5 milliGRAM(s) Oral daily  apixaban 2.5 milliGRAM(s) Oral every 12 hours  aspirin  chewable 81 milliGRAM(s) Oral daily  carvedilol 25 milliGRAM(s) Oral every 12 hours  citalopram 20 milliGRAM(s) Oral daily  clopidogrel Tablet 75 milliGRAM(s) Oral daily  furosemide    Tablet 40 milliGRAM(s) Oral daily  levothyroxine 75 MICROGram(s) Oral daily  niacin  milliGRAM(s) Oral at bedtime    MEDICATIONS  (PRN):  ALPRAZolam 0.25 milliGRAM(s) Oral daily PRN anxiety  dicyclomine 20 milliGRAM(s) Oral daily PRN diarrhea        RADIOLOGY & ADDITIONAL TESTS:
[facility] Cardiology Progress Note  _______________________________    Pt. seen and examined. No new cardiac-related complaints.    Telemetry -sinus 50-70, pvcs    T(C): 36.7 (05-27-19 @ 04:30), Max: 36.9 (05-26-19 @ 17:36)  HR: 62 (05-27-19 @ 06:45) (62 - 69)  BP: 155/68 (05-27-19 @ 06:45) (130/70 - 168/63)  RR: 18 (05-27-19 @ 04:30) (18 - 18)  SpO2: 97% (05-27-19 @ 06:11) (94% - 97%)  I&O's Summary    26 May 2019 07:01  -  27 May 2019 07:00  --------------------------------------------------------  IN: 168 mL / OUT: 2770 mL / NET: -2602 mL    27 May 2019 07:01  -  27 May 2019 09:26  --------------------------------------------------------  IN: 240 mL / OUT: 250 mL / NET: -10 mL        PHYSICAL EXAM:  GENERAL: Alert, NAD.  NECK: Supple  CHEST/LUNG: crackles bilaterally.  HEART: S1 S2 normal, RRR; No murmurs, rubs, or gallops  ABDOMEN: Soft, Nondistended  EXTREMITIES:  No LE edema.      LABS:                        11.8   8.21  )-----------( 195      ( 27 May 2019 08:55 )             36.4     05-27    142  |  101  |  43<H>  ----------------------------<  113<H>  4.2   |  27  |  1.59<H>    Ca    9.1      27 May 2019 05:44      PTT - ( 27 May 2019 08:36 )  PTT:65.0 sec  CARDIAC MARKERS ( 25 May 2019 06:39 )  x     / x     / 59 U/L / x     / 2.8 ng/mL  CARDIAC MARKERS ( 24 May 2019 18:25 )  x     / x     / 79 U/L / x     / 2.5 ng/mL              MEDICATIONS  (STANDING):  amLODIPine   Tablet 5 milliGRAM(s) Oral daily  apixaban 2.5 milliGRAM(s) Oral every 12 hours  aspirin  chewable 81 milliGRAM(s) Oral daily  carvedilol 25 milliGRAM(s) Oral every 12 hours  citalopram 20 milliGRAM(s) Oral daily  clopidogrel Tablet 75 milliGRAM(s) Oral daily  furosemide   Injectable 40 milliGRAM(s) IV Push two times a day  levothyroxine 75 MICROGram(s) Oral daily  niacin  milliGRAM(s) Oral at bedtime    MEDICATIONS  (PRN):  ALPRAZolam 0.25 milliGRAM(s) Oral daily PRN anxiety  dicyclomine 20 milliGRAM(s) Oral daily PRN diarrhea        RADIOLOGY & ADDITIONAL TESTS:
[facility] Cardiology Progress Note  _______________________________    Pt. seen and examined. No new cardiac-related complaints.  Breathing is improving. No chest pains.    Telemetry - sinus 60-70's, PVC.    T(C): 36.7 (05-26-19 @ 04:30), Max: 36.7 (05-25-19 @ 11:26)  HR: 63 (05-26-19 @ 09:24) (62 - 72)  BP: 160/70 (05-26-19 @ 04:30) (142/68 - 168/67)  RR: 18 (05-26-19 @ 04:30) (18 - 18)  SpO2: 96% (05-26-19 @ 09:24) (95% - 97%)  I&O's Summary    25 May 2019 07:01  -  26 May 2019 07:00  --------------------------------------------------------  IN: 1434 mL / OUT: 2650 mL / NET: -1216 mL    26 May 2019 07:01  -  26 May 2019 10:13  --------------------------------------------------------  IN: 0 mL / OUT: 400 mL / NET: -400 mL        PHYSICAL EXAM:  GENERAL: Elderly M alert NAD  NECK: Supple, + JVD  CHEST/LUNG: Clear to auscultation bilaterally; No wheezes, rales, or rhonchi  HEART: S1 S2 normal, RRR, 3/6 late peaking systolic cresc decres murmur at base radiating to carotids.  ABDOMEN: Soft, Nontender, Nondistended; Bowel sounds present  EXTREMITIES:  2+ pitting LE edema b/l with stasis changes.      LABS:                        11.5   9.46  )-----------( 182      ( 26 May 2019 09:31 )             35.7     05-26    139  |  100  |  42<H>  ----------------------------<  112<H>  3.3<L>   |  26  |  1.65<H>    Ca    9.1      26 May 2019 06:16  Mg     2.2     05-25    TPro  7.2  /  Alb  3.4  /  TBili  1.2  /  DBili  x   /  AST  34  /  ALT  23  /  AlkPhos  48  05-24    PT/INR - ( 24 May 2019 15:12 )   PT: 16.4 sec;   INR: 1.41 ratio         PTT - ( 25 May 2019 23:43 )  PTT:56.6 sec  CARDIAC MARKERS ( 25 May 2019 06:39 )  x     / x     / 59 U/L / x     / 2.8 ng/mL  CARDIAC MARKERS ( 24 May 2019 18:25 )  x     / x     / 79 U/L / x     / 2.5 ng/mL              MEDICATIONS  (STANDING):  amLODIPine   Tablet 5 milliGRAM(s) Oral daily  aspirin  chewable 81 milliGRAM(s) Oral daily  carvedilol 25 milliGRAM(s) Oral every 12 hours  citalopram 20 milliGRAM(s) Oral daily  clopidogrel Tablet 75 milliGRAM(s) Oral daily  furosemide   Injectable 40 milliGRAM(s) IV Push two times a day  heparin  Infusion.  Unit(s)/Hr (10 mL/Hr) IV Continuous <Continuous>  levothyroxine 75 MICROGram(s) Oral daily  niacin  milliGRAM(s) Oral at bedtime  potassium chloride    Tablet ER 40 milliEquivalent(s) Oral every 4 hours    MEDICATIONS  (PRN):  ALPRAZolam 0.25 milliGRAM(s) Oral daily PRN anxiety  dicyclomine 20 milliGRAM(s) Oral daily PRN diarrhea  heparin  Injectable 6000 Unit(s) IV Push every 6 hours PRN For aPTT less than 40      RADIOLOGY & ADDITIONAL TESTS:
Patient is a 89y old  Male who presents with a chief complaint of dyspnea (27 May 2019 18:02)      SUBJECTIVE / OVERNIGHT EVENTS:  No events on tele overnight  Feel well.  no complaints.   no cp, no sob, no n/v/d.  no abd pain.   no events on tele.   on PO lasix      Vital Signs Last 24 Hrs  T(C): 36.5 (29 May 2019 04:17), Max: 36.8 (28 May 2019 12:21)  T(F): 97.7 (29 May 2019 04:17), Max: 98.2 (28 May 2019 12:21)  HR: 67 (29 May 2019 09:32) (65 - 71)  BP: 131/68 (29 May 2019 04:17) (116/52 - 131/68)  BP(mean): --  RR: 18 (29 May 2019 04:17) (18 - 18)  SpO2: 92% (29 May 2019 09:32) (92% - 98%)        PHYSICAL EXAM:  GENERAL: NAD, Comfortable  HEAD:  Atraumatic, Normocephalic  EYES: EOMI, PERRLA, conjunctiva and sclera clear  NECK: Supple, No JVD  CHEST/LUNG: Clear to auscultation bilaterally; No wheeze  HEART: Regular rate and rhythm; No murmurs, rubs, or gallops  ABDOMEN: Soft, Nontender, Nondistended; Bowel sounds present  Neuro: AAO x 3, no focal deficit, 5/5 b/l extremities  EXTREMITIES:  2+ Peripheral Pulses, No clubbing, cyanosis, or edema  SKIN: No rashes or lesions      LABS:                        11.9   7.16  )-----------( 242      ( 29 May 2019 08:39 )             36.8     05-29    140  |  96  |  61<H>  ----------------------------<  108<H>  4.0   |  30  |  1.85<H>    Ca    9.4      29 May 2019 06:21  Mg     2.1     05-28        CAPILLARY BLOOD GLUCOSE                      RADIOLOGY & ADDITIONAL TESTS:    Imaging Personally Reviewed:  [x] YES  [ ] NO    Consultant(s) Notes Reviewed:  [x] YES  [ ] NO      MEDICATIONS  (STANDING):  amLODIPine   Tablet 5 milliGRAM(s) Oral daily  apixaban 2.5 milliGRAM(s) Oral every 12 hours  aspirin  chewable 81 milliGRAM(s) Oral daily  carvedilol 25 milliGRAM(s) Oral every 12 hours  citalopram 20 milliGRAM(s) Oral daily  clopidogrel Tablet 75 milliGRAM(s) Oral daily  furosemide    Tablet 40 milliGRAM(s) Oral two times a day  levothyroxine 75 MICROGram(s) Oral daily  niacin  milliGRAM(s) Oral at bedtime    MEDICATIONS  (PRN):  ALPRAZolam 0.25 milliGRAM(s) Oral daily PRN anxiety  dicyclomine 20 milliGRAM(s) Oral daily PRN diarrhea      Care Discussed with Consultants/Other Providers [x] YES  [ ] NO    HEALTH ISSUES - PROBLEM Dx:  Acute deep vein thrombosis (DVT) of femoral vein of left lower extremity: Acute deep vein thrombosis (DVT) of femoral vein of left lower extremity  Nonrheumatic aortic valve stenosis: Nonrheumatic aortic valve stenosis  Coronary artery disease involving coronary bypass graft of native heart without angina pectoris: Coronary artery disease involving coronary bypass graft of native heart without angina pectoris  Troponin I above reference range: Troponin I above reference range  Acute heart failure, unspecified heart failure type: Acute heart failure, unspecified heart failure type  Anxiety: Anxiety  Chronic kidney disease (CKD), stage III (moderate): Chronic kidney disease (CKD), stage III (moderate)  IBS (irritable bowel syndrome): IBS (irritable bowel syndrome)  Prophylactic measure: Prophylactic measure  Hypothyroid: Hypothyroid  CAD (coronary artery disease) of artery bypass graft: CAD (coronary artery disease) of artery bypass graft  Hypertension: Hypertension  Diabetes: Diabetes  Lower extremity edema: Lower extremity edema  SOB (shortness of breath) on exertion: SOB (shortness of breath) on exertion
Patient is a 89y old  Male who presents with a chief complaint of dyspnea (27 May 2019 18:02)      SUBJECTIVE / OVERNIGHT EVENTS:  No events.  Feel well.  no complaints.   no cp, no sob, no n/v/d.  no abd pain.   no events on tele.   switched to PO diuretics today.       Vital Signs Last 24 Hrs  T(C): 36.8 (28 May 2019 12:21), Max: 36.9 (28 May 2019 04:38)  T(F): 98.2 (28 May 2019 12:21), Max: 98.5 (28 May 2019 04:38)  HR: 67 (28 May 2019 17:28) (64 - 73)  BP: 116/52 (28 May 2019 17:28) (116/52 - 138/54)  BP(mean): --  RR: 18 (28 May 2019 12:21) (17 - 18)  SpO2: 97% (28 May 2019 17:08) (94% - 97%)  I&O's Summary    27 May 2019 07:01  -  28 May 2019 07:00  --------------------------------------------------------  IN: 500 mL / OUT: 1500 mL / NET: -1000 mL    28 May 2019 07:01  -  28 May 2019 18:13  --------------------------------------------------------  IN: 480 mL / OUT: 700 mL / NET: -220 mL        PHYSICAL EXAM:  GENERAL: NAD, Comfortable  HEAD:  Atraumatic, Normocephalic  EYES: EOMI, PERRLA, conjunctiva and sclera clear  NECK: Supple, No JVD  CHEST/LUNG: Clear to auscultation bilaterally; No wheeze  HEART: Regular rate and rhythm; No murmurs, rubs, or gallops  ABDOMEN: Soft, Nontender, Nondistended; Bowel sounds present  Neuro: AAO x 3, no focal deficit, 5/5 b/l extremities  EXTREMITIES:  2+ Peripheral Pulses, No clubbing, cyanosis, or edema  SKIN: No rashes or lesions    LABS:                        12.1   8.4   )-----------( 230      ( 28 May 2019 05:46 )             36.9     05-28    138  |  97  |  50<H>  ----------------------------<  109<H>  4.1   |  29  |  1.76<H>    Ca    9.3      28 May 2019 05:46  Mg     2.1     05-28      PTT - ( 27 May 2019 08:36 )  PTT:65.0 sec  CAPILLARY BLOOD GLUCOSE                RADIOLOGY & ADDITIONAL TESTS:    Imaging Personally Reviewed:  [x] YES  [ ] NO    Consultant(s) Notes Reviewed:  [x] YES  [ ] NO      MEDICATIONS  (STANDING):  amLODIPine   Tablet 5 milliGRAM(s) Oral daily  apixaban 2.5 milliGRAM(s) Oral every 12 hours  aspirin  chewable 81 milliGRAM(s) Oral daily  carvedilol 25 milliGRAM(s) Oral every 12 hours  citalopram 20 milliGRAM(s) Oral daily  clopidogrel Tablet 75 milliGRAM(s) Oral daily  furosemide    Tablet 40 milliGRAM(s) Oral two times a day  levothyroxine 75 MICROGram(s) Oral daily  niacin  milliGRAM(s) Oral at bedtime    MEDICATIONS  (PRN):  ALPRAZolam 0.25 milliGRAM(s) Oral daily PRN anxiety  dicyclomine 20 milliGRAM(s) Oral daily PRN diarrhea      Care Discussed with Consultants/Other Providers [x] YES  [ ] NO    HEALTH ISSUES - PROBLEM Dx:  Acute deep vein thrombosis (DVT) of femoral vein of left lower extremity: Acute deep vein thrombosis (DVT) of femoral vein of left lower extremity  Nonrheumatic aortic valve stenosis: Nonrheumatic aortic valve stenosis  Coronary artery disease involving coronary bypass graft of native heart without angina pectoris: Coronary artery disease involving coronary bypass graft of native heart without angina pectoris  Troponin I above reference range: Troponin I above reference range  Acute heart failure, unspecified heart failure type: Acute heart failure, unspecified heart failure type  Anxiety: Anxiety  Chronic kidney disease (CKD), stage III (moderate): Chronic kidney disease (CKD), stage III (moderate)  IBS (irritable bowel syndrome): IBS (irritable bowel syndrome)  Prophylactic measure: Prophylactic measure  Hypothyroid: Hypothyroid  CAD (coronary artery disease) of artery bypass graft: CAD (coronary artery disease) of artery bypass graft  Hypertension: Hypertension  Diabetes: Diabetes  Lower extremity edema: Lower extremity edema  SOB (shortness of breath) on exertion: SOB (shortness of breath) on exertion
Patient is a 89y old  Male who presents with a chief complaint of dyspnea (26 May 2019 10:13)      SUBJECTIVE / OVERNIGHT EVENTS:  neg outpt.  doing well on diuresis  losing water weight.  now on room air.  no cp, no sob, no n/v/d. no abdominal pain.  no headache, no dizziness.       Vital Signs Last 24 Hrs  T(C): 36.9 (26 May 2019 20:34), Max: 36.9 (26 May 2019 17:36)  T(F): 98.4 (26 May 2019 20:34), Max: 98.5 (26 May 2019 17:36)  HR: 69 (26 May 2019 20:34) (63 - 72)  BP: 130/70 (26 May 2019 20:34) (130/70 - 160/70)  BP(mean): --  RR: 18 (26 May 2019 20:34) (18 - 18)  SpO2: 94% (26 May 2019 20:34) (94% - 96%)  I&O's Summary    25 May 2019 07:01  -  26 May 2019 07:00  --------------------------------------------------------  IN: 1434 mL / OUT: 2650 mL / NET: -1216 mL    26 May 2019 07:01  -  26 May 2019 20:44  --------------------------------------------------------  IN: 168 mL / OUT: 1850 mL / NET: -1682 mL        PHYSICAL EXAM:  GENERAL: NAD, well-developed, comfortable, on room air, out of bed in chair  HEAD:  Atraumatic, Normocephalic  EYES: EOMI, PERRLA, conjunctiva and sclera clear  NECK: Supple, No JVD  CHEST/LUNG: mild decrease breath sounds bilaterally, basilar crackles; No wheeze   HEART: Regular rate and rhythm; No murmurs, rubs, or gallops  ABDOMEN: Soft, Nontender, Nondistended; Bowel sounds present  Neuro: AAOx3, no focal weakness, 5/5 b/l extremity strength  EXTREMITIES:  2+ Peripheral Pulses, No clubbing, cyanosis, +2 edema  SKIN: No rashes or lesions      LABS:                        11.5   9.46  )-----------( 182      ( 26 May 2019 09:31 )             35.7     05-26    139  |  100  |  42<H>  ----------------------------<  112<H>  3.3<L>   |  26  |  1.65<H>    Ca    9.1      26 May 2019 06:16  Mg     2.2     05-25      PTT - ( 25 May 2019 23:43 )  PTT:56.6 sec  CAPILLARY BLOOD GLUCOSE        CARDIAC MARKERS ( 25 May 2019 06:39 )  x     / x     / 59 U/L / x     / 2.8 ng/mL          RADIOLOGY & ADDITIONAL TESTS:    Imaging Personally Reviewed:  [x] YES  [ ] NO    Consultant(s) Notes Reviewed:  [x] YES  [ ] NO      MEDICATIONS  (STANDING):  amLODIPine   Tablet 5 milliGRAM(s) Oral daily  aspirin  chewable 81 milliGRAM(s) Oral daily  carvedilol 25 milliGRAM(s) Oral every 12 hours  citalopram 20 milliGRAM(s) Oral daily  clopidogrel Tablet 75 milliGRAM(s) Oral daily  furosemide   Injectable 40 milliGRAM(s) IV Push two times a day  heparin  Infusion.  Unit(s)/Hr (10 mL/Hr) IV Continuous <Continuous>  levothyroxine 75 MICROGram(s) Oral daily  niacin  milliGRAM(s) Oral at bedtime    MEDICATIONS  (PRN):  ALPRAZolam 0.25 milliGRAM(s) Oral daily PRN anxiety  dicyclomine 20 milliGRAM(s) Oral daily PRN diarrhea  heparin  Injectable 6000 Unit(s) IV Push every 6 hours PRN For aPTT less than 40      Care Discussed with Consultants/Other Providers [x] YES  [ ] NO    HEALTH ISSUES - PROBLEM Dx:  Acute deep vein thrombosis (DVT) of femoral vein of left lower extremity: Acute deep vein thrombosis (DVT) of femoral vein of left lower extremity  Nonrheumatic aortic valve stenosis: Nonrheumatic aortic valve stenosis  Coronary artery disease involving coronary bypass graft of native heart without angina pectoris: Coronary artery disease involving coronary bypass graft of native heart without angina pectoris  Troponin I above reference range: Troponin I above reference range  Acute heart failure, unspecified heart failure type: Acute heart failure, unspecified heart failure type  Anxiety: Anxiety  Chronic kidney disease (CKD), stage III (moderate): Chronic kidney disease (CKD), stage III (moderate)  IBS (irritable bowel syndrome): IBS (irritable bowel syndrome)  Prophylactic measure: Prophylactic measure  Hypothyroid: Hypothyroid  CAD (coronary artery disease) of artery bypass graft: CAD (coronary artery disease) of artery bypass graft  Hypertension: Hypertension  Diabetes: Diabetes  Lower extremity edema: Lower extremity edema  SOB (shortness of breath) on exertion: SOB (shortness of breath) on exertion
Patient is a 89y old  Male who presents with a chief complaint of dyspnea (25 May 2019 11:22)      SUBJECTIVE / OVERNIGHT EVENTS:  Pt seen and examined at bedside.   No overnight event.  Feeling better.  no cp, no sob, no n/v/d.   no events on tele.      Vital Signs Last 24 Hrs  T(C): 36.7 (25 May 2019 11:26), Max: 36.9 (25 May 2019 04:00)  T(F): 98 (25 May 2019 11:26), Max: 98.4 (25 May 2019 04:00)  HR: 72 (25 May 2019 14:51) (58 - 72)  BP: 144/67 (25 May 2019 11:26) (131/62 - 161/78)  BP(mean): --  RR: 18 (25 May 2019 11:26) (18 - 97)  SpO2: 97% (25 May 2019 14:51) (95% - 98%)  I&O's Summary    24 May 2019 07:01  -  25 May 2019 07:00  --------------------------------------------------------  IN: 300 mL / OUT: 920 mL / NET: -620 mL    25 May 2019 07:01  -  25 May 2019 16:09  --------------------------------------------------------  IN: 600 mL / OUT: 450 mL / NET: 150 mL        PHYSICAL EXAM:  GENERAL: NAD, well-developed, comfortable, on nasal canula, out of bed in chair  HEAD:  Atraumatic, Normocephalic  EYES: EOMI, PERRLA, conjunctiva and sclera clear  NECK: Supple, No JVD  CHEST/LUNG: mild decrease breath sounds bilaterally, basilar crackles; No wheeze   HEART: Regular rate and rhythm; No murmurs, rubs, or gallops  ABDOMEN: Soft, Nontender, Nondistended; Bowel sounds present  Neuro: AAOx3, no focal weakness, 5/5 b/l extremity strength  EXTREMITIES:  2+ Peripheral Pulses, No clubbing, cyanosis, +2 edema  SKIN: No rashes or lesions      LABS:                        11.8   8.61  )-----------( 164      ( 25 May 2019 10:38 )             35.6     05-25    138  |  100  |  36<H>  ----------------------------<  110<H>  3.2<L>   |  23  |  1.58<H>    Ca    9.0      25 May 2019 06:39  Mg     2.2     05-25    TPro  7.2  /  Alb  3.4  /  TBili  1.2  /  DBili  x   /  AST  34  /  ALT  23  /  AlkPhos  48  05-24    PT/INR - ( 24 May 2019 15:12 )   PT: 16.4 sec;   INR: 1.41 ratio         PTT - ( 25 May 2019 09:45 )  PTT:47.2 sec  CAPILLARY BLOOD GLUCOSE        CARDIAC MARKERS ( 25 May 2019 06:39 )  x     / x     / 59 U/L / x     / 2.8 ng/mL  CARDIAC MARKERS ( 24 May 2019 18:25 )  x     / x     / 79 U/L / x     / 2.5 ng/mL          RADIOLOGY & ADDITIONAL TESTS:    Imaging Personally Reviewed:  [x] YES  [ ] NO    Consultant(s) Notes Reviewed:  [x] YES  [ ] NO      MEDICATIONS  (STANDING):  amLODIPine   Tablet 5 milliGRAM(s) Oral daily  aspirin  chewable 81 milliGRAM(s) Oral daily  carvedilol 25 milliGRAM(s) Oral every 12 hours  citalopram 20 milliGRAM(s) Oral daily  clopidogrel Tablet 75 milliGRAM(s) Oral daily  furosemide   Injectable 40 milliGRAM(s) IV Push two times a day  heparin  Infusion.  Unit(s)/Hr (10 mL/Hr) IV Continuous <Continuous>  levothyroxine 75 MICROGram(s) Oral daily  niacin  milliGRAM(s) Oral at bedtime    MEDICATIONS  (PRN):  ALPRAZolam 0.25 milliGRAM(s) Oral daily PRN anxiety  dicyclomine 20 milliGRAM(s) Oral daily PRN diarrhea  heparin  Injectable 6000 Unit(s) IV Push every 6 hours PRN For aPTT less than 40      Care Discussed with Consultants/Other Providers [x] YES  [ ] NO    HEALTH ISSUES - PROBLEM Dx:  Acute deep vein thrombosis (DVT) of femoral vein of left lower extremity: Acute deep vein thrombosis (DVT) of femoral vein of left lower extremity  Nonrheumatic aortic valve stenosis: Nonrheumatic aortic valve stenosis  Coronary artery disease involving coronary bypass graft of native heart without angina pectoris: Coronary artery disease involving coronary bypass graft of native heart without angina pectoris  Troponin I above reference range: Troponin I above reference range  Acute heart failure, unspecified heart failure type: Acute heart failure, unspecified heart failure type  Anxiety: Anxiety  Chronic kidney disease (CKD), stage III (moderate): Chronic kidney disease (CKD), stage III (moderate)  IBS (irritable bowel syndrome): IBS (irritable bowel syndrome)  Prophylactic measure: Prophylactic measure  Hypothyroid: Hypothyroid  CAD (coronary artery disease) of artery bypass graft: CAD (coronary artery disease) of artery bypass graft  Hypertension: Hypertension  Diabetes: Diabetes  Lower extremity edema: Lower extremity edema  SOB (shortness of breath) on exertion: SOB (shortness of breath) on exertion
Patient is a 89y old  Male who presents with a chief complaint of dyspnea (27 May 2019 09:25)      SUBJECTIVE / OVERNIGHT EVENTS:  await TTE  overall feeling better  no event on tele.  +neg outpt  no cp, no sob, no n/v/d. no abdominal pain.  no headache, no dizziness.         Vital Signs Last 24 Hrs  T(C): 36.6 (27 May 2019 12:16), Max: 36.9 (26 May 2019 20:34)  T(F): 97.8 (27 May 2019 12:16), Max: 98.4 (26 May 2019 20:34)  HR: 66 (27 May 2019 17:38) (62 - 69)  BP: 114/61 (27 May 2019 17:38) (114/61 - 168/63)  BP(mean): --  RR: 17 (27 May 2019 12:16) (17 - 18)  SpO2: 94% (27 May 2019 16:32) (93% - 97%)  I&O's Summary    26 May 2019 07:01  -  27 May 2019 07:00  --------------------------------------------------------  IN: 168 mL / OUT: 2770 mL / NET: -2602 mL    27 May 2019 07:01  -  27 May 2019 18:02  --------------------------------------------------------  IN: 500 mL / OUT: 1000 mL / NET: -500 mL        PHYSICAL EXAM:  GENERAL: NAD, well-developed, comfortable, on room air, sitting up.   HEAD:  Atraumatic, Normocephalic  EYES: EOMI, PERRLA, conjunctiva and sclera clear  NECK: Supple, No JVD  CHEST/LUNG: mild decrease breath sounds bilaterally, basilar crackles; No wheeze   HEART: Regular rate and rhythm; No murmurs, rubs, or gallops  ABDOMEN: Soft, Nontender, Nondistended; Bowel sounds present  Neuro: AAOx3, no focal weakness, 5/5 b/l extremity strength  EXTREMITIES:  2+ Peripheral Pulses, No clubbing, cyanosis, +1 edema (improving today)  SKIN: No rashes or lesions      LABS:                        11.8   8.21  )-----------( 195      ( 27 May 2019 08:55 )             36.4     05-27    142  |  101  |  43<H>  ----------------------------<  113<H>  4.2   |  27  |  1.59<H>    Ca    9.1      27 May 2019 05:44      PTT - ( 27 May 2019 08:36 )  PTT:65.0 sec  CAPILLARY BLOOD GLUCOSE                RADIOLOGY & ADDITIONAL TESTS:    Imaging Personally Reviewed:  [x] YES  [ ] NO    Consultant(s) Notes Reviewed:  [x] YES  [ ] NO      MEDICATIONS  (STANDING):  amLODIPine   Tablet 5 milliGRAM(s) Oral daily  apixaban 2.5 milliGRAM(s) Oral every 12 hours  aspirin  chewable 81 milliGRAM(s) Oral daily  carvedilol 25 milliGRAM(s) Oral every 12 hours  citalopram 20 milliGRAM(s) Oral daily  clopidogrel Tablet 75 milliGRAM(s) Oral daily  furosemide   Injectable 40 milliGRAM(s) IV Push two times a day  levothyroxine 75 MICROGram(s) Oral daily  niacin  milliGRAM(s) Oral at bedtime    MEDICATIONS  (PRN):  ALPRAZolam 0.25 milliGRAM(s) Oral daily PRN anxiety  dicyclomine 20 milliGRAM(s) Oral daily PRN diarrhea      Care Discussed with Consultants/Other Providers [x] YES  [ ] NO    HEALTH ISSUES - PROBLEM Dx:  Acute deep vein thrombosis (DVT) of femoral vein of left lower extremity: Acute deep vein thrombosis (DVT) of femoral vein of left lower extremity  Nonrheumatic aortic valve stenosis: Nonrheumatic aortic valve stenosis  Coronary artery disease involving coronary bypass graft of native heart without angina pectoris: Coronary artery disease involving coronary bypass graft of native heart without angina pectoris  Troponin I above reference range: Troponin I above reference range  Acute heart failure, unspecified heart failure type: Acute heart failure, unspecified heart failure type  Anxiety: Anxiety  Chronic kidney disease (CKD), stage III (moderate): Chronic kidney disease (CKD), stage III (moderate)  IBS (irritable bowel syndrome): IBS (irritable bowel syndrome)  Prophylactic measure: Prophylactic measure  Hypothyroid: Hypothyroid  CAD (coronary artery disease) of artery bypass graft: CAD (coronary artery disease) of artery bypass graft  Hypertension: Hypertension  Diabetes: Diabetes  Lower extremity edema: Lower extremity edema  SOB (shortness of breath) on exertion: SOB (shortness of breath) on exertion

## 2019-05-30 NOTE — PROGRESS NOTE ADULT - PROBLEM SELECTOR PLAN 2
LE dopplers shows left femoral vein DVT  on Heparin drip
LE dopplers shows left femoral vein DVT  on Heparin drip  V/Q scan ordered.
LE dopplers shows left femoral vein DVT  on Heparin drip, switched to Eliquis PO by card  V/Q scan ordered.
-  - hstroponins elevated.  CPK's are normal.  - likely supply-demand mismatch in setting of CKD, valve disease, decompensated HF,  known coronary disease.  - doubt ACS.  EKG uninterpretable for ischemic change due to LBBB, which is reportedly old per cardiology fellow note.  - cont antiplatelets.
-  - hstroponins elevated.  CPK's are normal.  - likely supply-demand mismatch in setting of CKD, valve disease, decompensated HF,  known coronary disease.  - doubt ACS.  EKG uninterpretable for ischemic change due to LBBB, which is reportedly old per cardiology fellow note.  - cont antiplatelets.
-  - hstroponins elevated.  CPK's are normal.  - likely supply-demand mismatch in setting of CKD, valve disease, decompensated HF,  known coronary disease.  - doubt ACS.  EKG uninterpretable for ischemic change due to LBBB, which is reportedly old per cardiology fellow note.  - echo as noted above for LV function.  - cont antiplatelets.
-  - hstroponins elevated.  CPK's are normal.  - likely supply-demand mismatch in setting of CKD, valve disease, decompensated HF,  known coronary disease.  - doubt ACS.  EKG uninterpretable for ischemic change due to LBBB, which is reportedly old per cardiology fellow note.  - echo as noted above for LV function.  - cont antiplatelets.
LE dopplers shows left femoral vein DVT  on Heparin drip, switched to Eliquis PO by card  V/Q scan ordered.
LE dopplers shows left femoral vein DVT  on Heparin drip, switched to Eliquis PO by card  V/Q scan -> very low probability of PE

## 2019-05-30 NOTE — PROGRESS NOTE ADULT - PROBLEM SELECTOR PLAN 3
last hgbA1c one 6%  now 5%  diet controlled  will start insulin sliding scale if needed
-  - cont ASA and plavix.  - cont other home cardiac meds.
-  - cont ASA and plavix.  - cont other home cardiac meds.
-  - cont ASA. since he is being started on eliquis (for dvt), will discontinue plavix.   - cont other home cardiac meds.
-  - cont ASA. since he is being started on eliquis (for dvt), will discontinue plavix.   - cont other home cardiac meds.
last hgbA1c one 6%  now 5%  diet controlled  will start insulin sliding scale if needed
last hgbA1c one 6%  now 5%  diet controlled  will start insulin sliding scale if needed

## 2019-05-30 NOTE — PROGRESS NOTE ADULT - PROBLEM SELECTOR PROBLEM 4
Hypertension
Nonrheumatic aortic valve stenosis
Hypertension

## 2019-05-30 NOTE — PROGRESS NOTE ADULT - PROBLEM SELECTOR PLAN 4
BP meds as above  Coreg, Lasix, norvasc
-  - TTE shows moderate aortic stenosis (low gradient).  - Follow up with outpatient cardiologist.
-  - TTE shows moderate aortic stenosis (low gradient).  - Follow up with outpatient cardiologist.
-  - pt unsure of severity.  - f/u echo - if AS is severe will need to discuss possible eventual TAVR after acute issues are treated.
-  - pt unsure of severity.  - f/u echo - if AS is severe will need to discuss possible eventual TAVR after acute issues are treated.
BP meds as above  Coreg, Lasix, norvasc
BP meds as above  Coreg, Lasix, norvasc

## 2019-05-30 NOTE — PROGRESS NOTE ADULT - PROBLEM SELECTOR PLAN 1
Likely acute CHF with gross volume overload  elevated proBNP 01623. CXR with pulm edema  EKG with chronic LBBB (old EKGs in the chart)  Hypoxic to 90% on room air on admission, placed on 2L nasal canula. Now doing better.   continue Lasix 40 mg IV BID, daily weights, strick I/O  Troponin elevated  r/o ACS vs. elevated trop in the setting of demand ischemia/CHF/CKD  cardiology consult appreciated.  c/w Coreg 37.5 mg BID, Plavix 75 mg, Asa 81 (received 4 tabs of asa in ED)  check TTE for LV function and valvular dysfunction   RVP neg
Likely acute CHF with gross volume overload  elevated proBNP 41845. CXR with pulm edema  EKG with chronic LBBB (old EKGs in the chart)  Hypoxic to 90% on room air on admission, placed on 2L nasal canula. Now doing better.   continue Lasix 40 mg IV BID, daily weights, strick I/O  Troponin elevated  r/o ACS vs. elevated trop in the setting of demand ischemia/CHF/CKD  cardiology consult appreciated.  c/w Coreg 37.5 mg BID, Plavix 75 mg, Asa 81 (received 4 tabs of asa in ED)  check TTE for LV function and valvular dysfunction   RVP neg
Likely acute CHF.   elevated proBNP 82679. CXR with pulm edema  EKG with chronic LBBB. Hypoxic to 90%, placed on 2L nasal canula.   s/p 40 mg IV Lasix x 1 in ED with some improvement  continue Lasix 40 mg IV BID, daily weights, strick I/O  Troponin elevated  r/o ACS vs. elevated trop in the setting of demand ischemia/CHF/CKD  cardiology consult appreciated.  c/w Coreg 37.5 mg BID, Plavix 75 mg, Asa 81 (received 4 tabs of asa in ED)  check TTE for LV function and valvular dysfunction   RVP neg
-  - Switch to po lasix 40 mg daily.   - measure I/O and weights daily.   - Echo shows moderate LV dysfunction. given age and renal function. relative stability will defer further invasive evaluation. Optimize medical therapy.   - Coreg 25 mg bid  - Not a candidate for ACE-I/ARBs due to kidney disease.
-  - cont lasix 40 mg IVP BID.  - measure I/O and weights daily.   - echo to eval LV systolic and diastolic function, valves.
-  - po lasix 40 mg daily.   - measure I/O and weights daily.   - Echo shows moderate LV dysfunction. given age and renal function. relative stability will defer further invasive evaluation. Optimize medical therapy.   - Coreg 25 mg bid  - Not a candidate for ACE-I/ARBs due to kidney disease.
Likely acute CHF with gross volume overload  elevated proBNP 38005. CXR with pulm edema  EKG with chronic LBBB (old EKGs in the chart)  Hypoxic to 90% on room air on admission, placed on 2L nasal canula. Now doing better.   Troponin elevated  r/o ACS vs. elevated trop in the setting of demand ischemia/CHF/CKD  cardiology consult appreciated.  c/w Coreg 37.5 mg BID, Plavix 75 mg, Asa 81 (received 4 tabs of asa in ED)  TTE shows EF 35%, mod AS.   RVP neg  s/p Lasix 40 mg IV BID, daily weights, strick I/O  switched to PO diuretics 5/28/19
Problem: Acute heart failure, unspecified heart failure type. Recommendation: -  -  - cont lasix 40 mg IVP BID.  - measure I/O and weights daily.   - echo to eval LV systolic and diastolic function, valves.
Likely acute CHF with gross volume overload  elevated proBNP 24721. CXR with pulm edema  EKG with chronic LBBB (old EKGs in the chart)  Hypoxic to 90% on room air on admission, placed on 2L nasal canula. Now doing better.   Troponin elevated  r/o ACS vs. elevated trop in the setting of demand ischemia/CHF/CKD  cardiology consult appreciated.  c/w Coreg 37.5 mg BID, Plavix 75 mg, Asa 81 (received 4 tabs of asa in ED)  TTE shows EF 35%, mod AS.   RVP neg  s/p Lasix 40 mg IV BID, daily weights, strick I/O  switched to PO diuretics 5/28/19

## 2019-06-08 ENCOUNTER — EMERGENCY (EMERGENCY)
Facility: HOSPITAL | Age: 84
LOS: 1 days | Discharge: HOME | End: 2019-06-08
Attending: EMERGENCY MEDICINE
Payer: MEDICARE

## 2019-06-08 VITALS
RESPIRATION RATE: 16 BRPM | HEART RATE: 76 BPM | TEMPERATURE: 99 F | DIASTOLIC BLOOD PRESSURE: 71 MMHG | SYSTOLIC BLOOD PRESSURE: 158 MMHG | OXYGEN SATURATION: 98 %

## 2019-06-08 DIAGNOSIS — Z95.1 PRESENCE OF AORTOCORONARY BYPASS GRAFT: Chronic | ICD-10-CM

## 2019-06-08 LAB — GAS PNL BLDV: SIGNIFICANT CHANGE UP

## 2019-06-08 PROCEDURE — 85014 HEMATOCRIT: CPT

## 2019-06-08 PROCEDURE — 82435 ASSAY OF BLOOD CHLORIDE: CPT

## 2019-06-08 PROCEDURE — 74018 RADEX ABDOMEN 1 VIEW: CPT

## 2019-06-08 PROCEDURE — 84295 ASSAY OF SERUM SODIUM: CPT

## 2019-06-08 PROCEDURE — 99284 EMERGENCY DEPT VISIT MOD MDM: CPT | Mod: GC

## 2019-06-08 PROCEDURE — 82803 BLOOD GASES ANY COMBINATION: CPT

## 2019-06-08 PROCEDURE — 84132 ASSAY OF SERUM POTASSIUM: CPT

## 2019-06-08 PROCEDURE — 82947 ASSAY GLUCOSE BLOOD QUANT: CPT

## 2019-06-08 PROCEDURE — 80053 COMPREHEN METABOLIC PANEL: CPT

## 2019-06-08 PROCEDURE — 83605 ASSAY OF LACTIC ACID: CPT

## 2019-06-08 PROCEDURE — 85027 COMPLETE CBC AUTOMATED: CPT

## 2019-06-08 PROCEDURE — 82330 ASSAY OF CALCIUM: CPT

## 2019-06-08 PROCEDURE — 99284 EMERGENCY DEPT VISIT MOD MDM: CPT | Mod: 25

## 2019-06-08 PROCEDURE — 74176 CT ABD & PELVIS W/O CONTRAST: CPT

## 2019-06-08 PROCEDURE — 74018 RADEX ABDOMEN 1 VIEW: CPT | Mod: 26

## 2019-06-09 VITALS
TEMPERATURE: 98 F | RESPIRATION RATE: 18 BRPM | SYSTOLIC BLOOD PRESSURE: 144 MMHG | OXYGEN SATURATION: 96 % | DIASTOLIC BLOOD PRESSURE: 65 MMHG | HEART RATE: 78 BPM

## 2019-06-09 LAB
ALBUMIN SERPL ELPH-MCNC: 3.7 G/DL — SIGNIFICANT CHANGE UP (ref 3.3–5)
ALP SERPL-CCNC: 43 U/L — SIGNIFICANT CHANGE UP (ref 40–120)
ALT FLD-CCNC: 11 U/L — SIGNIFICANT CHANGE UP (ref 10–45)
ANION GAP SERPL CALC-SCNC: 12 MMOL/L — SIGNIFICANT CHANGE UP (ref 5–17)
AST SERPL-CCNC: 17 U/L — SIGNIFICANT CHANGE UP (ref 10–40)
BASE EXCESS BLDV CALC-SCNC: 2.8 MMOL/L — HIGH (ref -2–2)
BASOPHILS # BLD AUTO: 0 K/UL — SIGNIFICANT CHANGE UP (ref 0–0.2)
BASOPHILS NFR BLD AUTO: 0 % — SIGNIFICANT CHANGE UP (ref 0–2)
BILIRUB SERPL-MCNC: 0.6 MG/DL — SIGNIFICANT CHANGE UP (ref 0.2–1.2)
BUN SERPL-MCNC: 40 MG/DL — HIGH (ref 7–23)
CA-I SERPL-SCNC: 1.25 MMOL/L — SIGNIFICANT CHANGE UP (ref 1.12–1.3)
CALCIUM SERPL-MCNC: 9.8 MG/DL — SIGNIFICANT CHANGE UP (ref 8.4–10.5)
CHLORIDE BLDV-SCNC: 105 MMOL/L — SIGNIFICANT CHANGE UP (ref 96–108)
CHLORIDE SERPL-SCNC: 100 MMOL/L — SIGNIFICANT CHANGE UP (ref 96–108)
CO2 BLDV-SCNC: 28 MMOL/L — SIGNIFICANT CHANGE UP (ref 22–30)
CO2 SERPL-SCNC: 25 MMOL/L — SIGNIFICANT CHANGE UP (ref 22–31)
CREAT SERPL-MCNC: 1.79 MG/DL — HIGH (ref 0.5–1.3)
EOSINOPHIL # BLD AUTO: 0.1 K/UL — SIGNIFICANT CHANGE UP (ref 0–0.5)
EOSINOPHIL NFR BLD AUTO: 0.8 % — SIGNIFICANT CHANGE UP (ref 0–6)
GAS PNL BLDV: 140 MMOL/L — SIGNIFICANT CHANGE UP (ref 136–145)
GAS PNL BLDV: SIGNIFICANT CHANGE UP
GLUCOSE BLDV-MCNC: 124 MG/DL — HIGH (ref 70–99)
GLUCOSE SERPL-MCNC: 137 MG/DL — HIGH (ref 70–99)
HCO3 BLDV-SCNC: 27 MMOL/L — SIGNIFICANT CHANGE UP (ref 21–29)
HCT VFR BLD CALC: 37.6 % — LOW (ref 39–50)
HCT VFR BLDA CALC: 38 % — LOW (ref 39–50)
HGB BLD CALC-MCNC: 12.2 G/DL — LOW (ref 13–17)
HGB BLD-MCNC: 12.3 G/DL — LOW (ref 13–17)
HOROWITZ INDEX BLDV+IHG-RTO: SIGNIFICANT CHANGE UP
LACTATE BLDV-MCNC: 1.4 MMOL/L — SIGNIFICANT CHANGE UP (ref 0.7–2)
LYMPHOCYTES # BLD AUTO: 1.4 K/UL — SIGNIFICANT CHANGE UP (ref 1–3.3)
LYMPHOCYTES # BLD AUTO: 10.6 % — LOW (ref 13–44)
MCHC RBC-ENTMCNC: 31.3 PG — SIGNIFICANT CHANGE UP (ref 27–34)
MCHC RBC-ENTMCNC: 32.8 GM/DL — SIGNIFICANT CHANGE UP (ref 32–36)
MCV RBC AUTO: 95.5 FL — SIGNIFICANT CHANGE UP (ref 80–100)
MONOCYTES # BLD AUTO: 0.5 K/UL — SIGNIFICANT CHANGE UP (ref 0–0.9)
MONOCYTES NFR BLD AUTO: 3.7 % — SIGNIFICANT CHANGE UP (ref 2–14)
NEUTROPHILS # BLD AUTO: 11.5 K/UL — HIGH (ref 1.8–7.4)
NEUTROPHILS NFR BLD AUTO: 84.9 % — HIGH (ref 43–77)
PCO2 BLDV: 42 MMHG — SIGNIFICANT CHANGE UP (ref 35–50)
PH BLDV: 7.42 — SIGNIFICANT CHANGE UP (ref 7.35–7.45)
PLATELET # BLD AUTO: 219 K/UL — SIGNIFICANT CHANGE UP (ref 150–400)
PO2 BLDV: 26 MMHG — SIGNIFICANT CHANGE UP (ref 25–45)
POTASSIUM BLDV-SCNC: 4.2 MMOL/L — SIGNIFICANT CHANGE UP (ref 3.5–5.3)
POTASSIUM SERPL-MCNC: 4.4 MMOL/L — SIGNIFICANT CHANGE UP (ref 3.5–5.3)
POTASSIUM SERPL-SCNC: 4.4 MMOL/L — SIGNIFICANT CHANGE UP (ref 3.5–5.3)
PROT SERPL-MCNC: 8 G/DL — SIGNIFICANT CHANGE UP (ref 6–8.3)
RBC # BLD: 3.94 M/UL — LOW (ref 4.2–5.8)
RBC # FLD: 13.3 % — SIGNIFICANT CHANGE UP (ref 10.3–14.5)
SAO2 % BLDV: 44 % — LOW (ref 67–88)
SODIUM SERPL-SCNC: 137 MMOL/L — SIGNIFICANT CHANGE UP (ref 135–145)
WBC # BLD: 13.5 K/UL — HIGH (ref 3.8–10.5)
WBC # FLD AUTO: 13.5 K/UL — HIGH (ref 3.8–10.5)

## 2019-06-09 PROCEDURE — 74176 CT ABD & PELVIS W/O CONTRAST: CPT | Mod: 26

## 2019-06-09 RX ORDER — MORPHINE SULFATE 50 MG/1
2 CAPSULE, EXTENDED RELEASE ORAL ONCE
Refills: 0 | Status: DISCONTINUED | OUTPATIENT
Start: 2019-06-09 | End: 2019-06-09

## 2019-06-09 NOTE — ED PROVIDER NOTE - OBJECTIVE STATEMENT
89 yo male with PMH of ventral abdominal hernia (no surgery), CAD s/p CABG, complicated by perforated peptic ulcer s/p prolonged hospitalization back in 1990s, LLE DVT, HTN, diabetes (diet controlled, no longer on Metformin, last HgbA1C 6%) presents to the ED for abd pain that began acutely at home at rest at 5pm. States his hernia felt hard and tender to the touch. Tried pushing on it and pain resolved afterwards. Denies fever, chills, +passing flatus, denies melena, BRBPR, denies n/v, diarrhea, urinary symptoms. NAD at this time, feels completely improved. 89 yo male with PMH of ventral abdominal hernia (no surgery), CAD s/p CABG, complicated by perforated peptic ulcer s/p prolonged hospitalization back in 1990s, LLE DVT, HTN, diabetes (diet controlled, no longer on Metformin, last HgbA1C 6%) presents to the ED for abd pain that began acutely at home at rest at 5pm. States his hernia felt hard and tender to the touch. Tried pushing on it and pain resolved afterwards. Denies fever, chills, +passing flatus, denies melena, BRBPR, denies n/v, diarrhea, urinary symptoms. Initially in ED, pt in pain, after ED attending applied pressure to hernia, successfully reduced and pain resolved.

## 2019-06-09 NOTE — CONSULT NOTE ADULT - SUBJECTIVE AND OBJECTIVE BOX
General Surgery Consult  Consulting surgical team: [facility]  Consulting attending: [place]    HPI: 89 yo male with PMH of ventral abdominal hernia (no surgery), CAD s/p CABG, complicated by perforated peptic ulcer s/p prolonged hospitalization back in 1990s, LLE DVT, HTN, diabetes (diet controlled, no longer on Metformin, last HgbA1C 6%) presents to the ED for abd pain that began acutely at home at rest at 5pm. States his hernia felt hard and tender to the touch. Tried pushing on it and pain resolved after applying pressure but feels hernia reduced spontaneously. Denies fever, chills, +passing flatus and BMs, denies melena, BRBPR, denies n/v, diarrhea, urinary symptoms. Currently has no pain and is hungry      PAST MEDICAL HISTORY:  Ventral hernia  Stented coronary artery  Hypertension  Diabetes  HTN (hypertension)  CAD (coronary artery disease) of artery bypass graft      PAST SURGICAL HISTORY:  S/P CABG (coronary artery bypass graft)      MEDICATIONS:      ALLERGIES:  ACE inhibitors (Other)  penicillin (Rash)      VITALS & I/Os:  Vital Signs Last 24 Hrs  T(C): 36.7 (09 Jun 2019 03:00), Max: 37.4 (08 Jun 2019 23:01)  T(F): 98.1 (09 Jun 2019 03:00), Max: 99.3 (08 Jun 2019 23:01)  HR: 78 (09 Jun 2019 03:00) (73 - 78)  BP: 144/65 (09 Jun 2019 03:00) (144/65 - 164/61)  BP(mean): --  RR: 18 (09 Jun 2019 03:00) (16 - 20)  SpO2: 96% (09 Jun 2019 03:00) (96% - 98%)    I&O's Summary      PHYSICAL EXAM:  General: No acute distress, appears nontoxic  Respiratory: Breathing unlabored  Cardiovascular: RRR  Abdominal: Soft, nondistended, nontender. No rebound. large ventral hernia with easily reducible hernia  Extremities: Warm, well perfused    LABS:                        12.3   13.5  )-----------( 219      ( 08 Jun 2019 23:56 )             37.6     06-08    137  |  100  |  40<H>  ----------------------------<  137<H>  4.4   |  25  |  1.79<H>    Ca    9.8      08 Jun 2019 23:56    TPro  8.0  /  Alb  3.7  /  TBili  0.6  /  DBili  x   /  AST  17  /  ALT  11  /  AlkPhos  43  06-08    Lactate:  06-08 @ 23:56  1.4                  IMAGING:    < from: CT Abdomen and Pelvis w/ Oral Cont (06.09.19 @ 00:29) >  IMPRESSION:     Supraumbilical left para midline ventral abdominal wall hernia containing   a short segment of small bowel. Small bowel loop within the hernia sac is   not thickened. No resultant bowel obstruction. Fat in the hernia sac is   infiltrated with a small amount of fluid which raises concern for   incarceration of the mesenteric fat. Hernia neck measures 3.8 cm.    Small bowel loops external to the hernia sac within the left and right   hemiabdomen aremildly thickened with surrounding mesenteric edema   reflective of an enteritis which may be infectious, inflammatory, or   ischemic.    < end of copied text >

## 2019-06-09 NOTE — CONSULT NOTE ADULT - ASSESSMENT
ASSESSMENT: Patient is a 89 M with extensive cardiac Hx pw ventral hernia after spontaneous reduction with signs of ischemia on CT    PLAN:    - Pain is no longer present and patient is hungry. Loop of bowel on CT with inflammation likely viable and requires no surgery  - PO challenge  - If pass PO challenge clear to go home from surgical standpoint  - Patient seen/examined with attending.  - Plan to be discussed with Attending, Dr. MAYDA Sepulveda MD  PGY 3

## 2019-06-09 NOTE — ED PROVIDER NOTE - PROGRESS NOTE DETAILS
Pt cleared by surgery c/s for d/c. Pt currently asymptomatic, normal exam and reassuring labs. Passed PO challenge. Given strict return precautions, will f/u with PMD outpatient.

## 2019-06-09 NOTE — ED ADULT NURSE NOTE - NSIMPLEMENTINTERV_GEN_ALL_ED
Implemented All Fall with Harm Risk Interventions:  [place] to call system. Call bell, personal items and telephone within reach. Instruct patient to call for assistance. Room bathroom lighting operational. Non-slip footwear when patient is off stretcher. Physically safe environment: no spills, clutter or unnecessary equipment. Stretcher in lowest position, wheels locked, appropriate side rails in place. Provide visual cue, wrist band, yellow gown, etc. Monitor gait and stability. Monitor for mental status changes and reorient to person, place, and time. Review medications for side effects contributing to fall risk. Reinforce activity limits and safety measures with patient and family. Provide visual clues: red socks.

## 2019-06-09 NOTE — CONSULT NOTE ADULT - ATTENDING COMMENTS
Patient seen and examined and agree with above.  89 year old with likely briefly incarcerated ventral hernia which resolved on its own. The patient has a history of CAD on multiple anticoagulant and antiplatelet medication and would be high risk for surgical intervention.  THe patient is hemodynamically appropriate.   His abdomen is soft nondistended and nontender.  I have reviewed PMH, PSH, labs med and imaging.  Patient is to be discharge to home.

## 2019-06-09 NOTE — ED ADULT NURSE NOTE - PMH
CAD (coronary artery disease) of artery bypass graft    Diabetes    HTN (hypertension)    Hypertension    Stented coronary artery    Ventral hernia

## 2019-06-09 NOTE — ED PROVIDER NOTE - ATTENDING CONTRIBUTION TO CARE
89 yom pmnx cad, dm, htn, presents w abd pain and enlargement of his known hernia. pt states began at 5 pm, hernia looks 'much larger' than baseline per pt w associated pain. non/v/d. passing gas today. no sim sxs prev. pain mod to severe.     ROS:   constitutional - no fever, no chills  eyes - no visual changes, no redness  eent - no sore throat, no nasal congestion  cvs - no chest pain, no leg swelling  resp - no shortness of breath, no cough  gi - + abdominal pain, no vomiting, no diarrhea  gu - no dysuria, no hematuria  msk - no acute back pain, no joint swelling  skin - no rashes, no jaundice  neuro - no headache, no focal weakness  psych - no acute mental health issue    Physical Exam:   constitutional - well appearing, awake and alert, oriented x3  head - no external evidence of trauma  eent - no conjunctival injection or icterus, mucous membranes moist   cvs - rrr, no murmurs, no peripheral edema  resp - breath sounds clear and equal bilat, normal respiratory effort  gi - abdomen soft, large ventral hernia present, bowel sounds present, no overlying skin chnages.   msk - moving all extremities spontaneously, gait is at baseline for patient  neuro - alert and oriented x3, no focal deficits, CNs 2-12 grossly intact  skin- no jaundice, warm and dry  psych - mood and affect wnl, no apparent risk to self or others     gentle pressure applied to hernia w some improvement , however hernia remains, will obtain ct to eval for associated strangulation/ sbo/ signs of ischemia.   ct as noted, surg seen and cleared for dc home, pt's hernia now soft and reduced. additional verbal instructions regarding diagnosis, return precautions and follow up plan given to pt and/or family. DANELLE Cox MD

## 2019-06-10 ENCOUNTER — EMERGENCY (EMERGENCY)
Facility: HOSPITAL | Age: 84
LOS: 1 days | End: 2019-06-10
Attending: EMERGENCY MEDICINE
Payer: MEDICARE

## 2019-06-10 VITALS
HEART RATE: 71 BPM | OXYGEN SATURATION: 95 % | DIASTOLIC BLOOD PRESSURE: 74 MMHG | WEIGHT: 210.1 LBS | SYSTOLIC BLOOD PRESSURE: 155 MMHG | TEMPERATURE: 98 F | RESPIRATION RATE: 18 BRPM | HEIGHT: 68 IN

## 2019-06-10 DIAGNOSIS — Z95.1 PRESENCE OF AORTOCORONARY BYPASS GRAFT: Chronic | ICD-10-CM

## 2019-06-10 PROCEDURE — 99283 EMERGENCY DEPT VISIT LOW MDM: CPT

## 2019-06-10 PROCEDURE — 99283 EMERGENCY DEPT VISIT LOW MDM: CPT | Mod: GC

## 2019-06-10 NOTE — ED ADULT NURSE NOTE - OBJECTIVE STATEMENT
89 y/o male pmhx CAD s/p CABG, ventral hernia, presenting to ED c/o abdominal pain. Patient was seen and evaluated in this ED for abdominal pain two days ago. Patient had a full workup including CT scan which showed no obstruction and no hernia strangulation. Abdominal pain returned today after eating lunch. Abdominal pain resolved prior to ED arrival. Reports pain as a 2/10 right now. Pt denies headache, dizziness, chest pain, palpitations, cough, SOB, n/v/d, urinary symptoms, fevers, chills, weakness at this time. A&Ox4 gross neuro intact, lungs cta bilaterally, no difficulty speaking in complete sentences, s1s2 heart sounds heard, pulses x 4, pringle x4, abdomen soft nontender nondistended, skin intact. Safety and comfort measures maintained.

## 2019-06-10 NOTE — ED ADULT NURSE NOTE - CHPI ED NUR SYMPTOMS NEG
no hematuria/no diarrhea/no abdominal distension/no blood in stool/no dysuria/no fever/no vomiting/no burning urination/no chills

## 2019-06-10 NOTE — ED ADULT NURSE NOTE - NSIMPLEMENTINTERV_GEN_ALL_ED
Implemented All Fall with Harm Risk Interventions:  [place] to call system. Call bell, personal items and telephone within reach. Instruct patient to call for assistance. Room bathroom lighting operational. Non-slip footwear when patient is off stretcher. Physically safe environment: no spills, clutter or unnecessary equipment. Stretcher in lowest position, wheels locked, appropriate side rails in place. Provide visual cue, wrist band, yellow gown, etc. Monitor gait and stability. Monitor for mental status changes and reorient to person, place, and time. Review medications for side effects contributing to fall risk. Reinforce activity limits and safety measures with patient and family. Provide visual clues: red socks.

## 2019-06-11 VITALS
HEART RATE: 60 BPM | OXYGEN SATURATION: 95 % | SYSTOLIC BLOOD PRESSURE: 145 MMHG | DIASTOLIC BLOOD PRESSURE: 75 MMHG | RESPIRATION RATE: 16 BRPM

## 2019-06-11 PROBLEM — K43.9 VENTRAL HERNIA WITHOUT OBSTRUCTION OR GANGRENE: Chronic | Status: ACTIVE | Noted: 2019-06-09

## 2019-06-11 NOTE — ED PROVIDER NOTE - ATTENDING CONTRIBUTION TO CARE
\89M, pmh htn, cad s/p cabg, ventral hernia, presents with abdominal pain, now resolved. Pt seen in ED two days ago for similar presentation, had labs, ct, surgery consult, surgery believed that pt had temporarily incarcerated hernia which spontaneously resolved and recommended no further intervention. Pt today ate lunch, again had abdominal pain of similar quality, pain was moderate to severe, located in region of hernia, hernia not reducible at time. After ~1-2 hrs pain resolved. Pt has been asymptomatic in ED. Denies n/v, diarrhea, melena, constipation, f/c, cp, sob, or other complaints.     PE: Well appearing elderly male in NAD, NCAT, MMM, Trachea midline, Normal conjunctiva, lungs CTAB, S1/S2 RRR, Normal perfusion, 2+ radial pulses bilat, Abdomen Soft, NTND, +Palpable easily reducible ventral hernia, No rebound/guarding, No LE edema, No deformity of extremities, No rashes,  No focal motor or sensory deficits.     Pt very well appearing. VS wnl. Exam unremarkable. Likely similar etiology of pt's previous presentation to ED. Labs, imaging performed at that time, which were unremarkable. I believe this is very unlikely to be another pathology giving pain, lactate at last presentation wnl, pt has noted hernia and pain at that site. I discussed with patient and daughter limited utility of labs at this time or any additional ED workup, they would like to not pursue further w/u at this time and f/u with surgery which I believe is appropriate. WIll PO challenge in ED and check for any recurrence of sx, ifi none, plan to d/c with outpatient f/u. Return precautions d/w patient and daughter in detail, they express understanding. - Arslan Ozuna MD

## 2019-06-11 NOTE — ED PROVIDER NOTE - CLINICAL SUMMARY MEDICAL DECISION MAKING FREE TEXT BOX
89M presenting with abdominal pain. no current symptoms. 89M presenting with abdominal pain. no current symptoms. abdomen soft, hernia easily reducible. will po challenge and likely discharge with surgery follow-up. - resident Scott Rae

## 2019-06-11 NOTE — ED PROVIDER NOTE - PSH
DRG trial is being cancelled as Dr Chaudhari Presummarie wants to do a TFESI first to see if he responds to it  Email sent to [employer] and also cancelled it in [address]  S/P CABG (coronary artery bypass graft)

## 2019-06-11 NOTE — ED PROVIDER NOTE - PROGRESS NOTE DETAILS
patient tolerating PO. still asymptomatic. will discharge with surgery follow-up. - resident Scott Rae

## 2019-06-11 NOTE — ED PROVIDER NOTE - CARE PROVIDER_API CALL
Sridevi Carter (MD)  Surgery; Surgical Critical Care  [address], [address] [address]  Phone: [phone]  Fax: [phone]  Follow Up Time:

## 2019-06-11 NOTE — ED PROVIDER NOTE - OBJECTIVE STATEMENT
89M, pmh of HtN, CAD s/p CABG, ventral hernia, presenting with abdominal pain. Patient was seen in the ED for abdominal pain two days ago. Was seen by surgery and CT scan showed no obstruction and no hernia strangulation. Had abdominal pain again today after eating lunch. Has not eaten since then. Abdominal pain resolved on its own before presenting to the ED. No fever, chest pain, vomiting, pain or burning with urination, constipation or diarrhea. Did not take any pain medications at home.

## 2019-06-11 NOTE — ED PROVIDER NOTE - NSFOLLOWUPINSTRUCTIONS_ED_ALL_ED_FT
Please follow-up with a surgeon in the next 1-2 weeks     If you have any worsening symptoms, severe abdominal pain, cannot push your hernia back in when laying down, you cannot tolerate food or fluid or you cannot have a bowel movement or pass gas please return to the emergency department

## 2019-06-11 NOTE — ED PROVIDER NOTE - PHYSICAL EXAMINATION
General: well appearing male, no acute distress   HEENT: normocephalic, atraumatic   Respiratory: normal work of breathing, lungs clear to auscultation bilaterally   Cardiac: regular rate and rhythm   Abdomen: soft, reducible hernia, no CVA tenderness  MSk: no swelling or tenderness of lower extremities   skin: no rashes  Neuro: A&Ox3

## 2019-06-18 PROBLEM — Z00.00 ENCOUNTER FOR PREVENTIVE HEALTH EXAMINATION: Status: ACTIVE | Noted: 2019-06-18

## 2019-06-20 ENCOUNTER — APPOINTMENT (OUTPATIENT)
Dept: SURGERY | Facility: CLINIC | Age: 84
End: 2019-06-20
Payer: MEDICARE

## 2019-06-20 DIAGNOSIS — L03.119 CELLULITIS OF UNSPECIFIED PART OF LIMB: ICD-10-CM

## 2019-06-20 DIAGNOSIS — E07.9 DISORDER OF THYROID, UNSPECIFIED: ICD-10-CM

## 2019-06-20 PROCEDURE — 99204 OFFICE O/P NEW MOD 45 MIN: CPT

## 2019-07-07 PROBLEM — E07.9 THYROID DISORDER: Status: ACTIVE | Noted: 2019-07-07

## 2019-07-07 PROBLEM — L03.119 CELLULITIS OF LEG, EXCEPT FOOT: Status: ACTIVE | Noted: 2019-07-07

## 2019-07-07 RX ORDER — LEVOTHYROXINE SODIUM 75 UG/1
75 TABLET ORAL
Refills: 0 | Status: ACTIVE | COMMUNITY

## 2019-07-07 NOTE — PHYSICAL EXAM
[Respiratory Effort] : normal respiratory effort [Normal Rate and Rhythm] : normal rate and rhythm [Abdominal Aorta] : Normal abdominal aorta [Abdominal Masses] : Abdominal mass present [Abdomen Tenderness] : ~T ~M No abdominal tenderness [No HSM] : no hepatosplenomegaly [Alert] : alert [Skin Ulcer] : ulcer [Oriented to Person] : oriented to person [Oriented to Place] : oriented to place [Oriented to Time] : oriented to time [Calm] : calm [de-identified] : WD/WN man in NAD [de-identified] : NCAT no scleral icterus [de-identified] : Walks with aid

## 2019-07-07 NOTE — ASSESSMENT
[FreeTextEntry1] : The patient has a ventral hernia which has become increasing symptomatic in the last month. His age and heart disease have precluded elective repair, however the risk of strangulation has now increased. I explained the risks, benefits and alternatives of repair. The risk related to his medical condition and not necessarily to the repair itself. He will be seen by cardiology and further evaluated.

## 2019-07-07 NOTE — HISTORY OF PRESENT ILLNESS
[de-identified] : The patient presents with a large ventral hernia. The patient has recently presented to the [facility] ED with symptoms consistent with incarceration. In these instances the symptoms resolved and due to his advanced age and history of congestive heart failure he was deemed not a candidate for urgent repair. The instances have recurred however. He was recently hospitalized for CHF in May.

## 2019-07-07 NOTE — REVIEW OF SYSTEMS
[Recent Weight Loss (___ Lbs)] : recent [unfilled] ~Ulb weight loss [Arthralgias] : arthralgias [Limb Pain] : limb pain [Limb Swelling] : limb swelling [Sleep Disturbances] : sleep disturbances [Negative] : Heme/Lymph

## 2019-07-10 ENCOUNTER — OUTPATIENT (OUTPATIENT)
Dept: OUTPATIENT SERVICES | Facility: HOSPITAL | Age: 84
LOS: 1 days | End: 2019-07-10
Payer: MEDICARE

## 2019-07-10 ENCOUNTER — APPOINTMENT (OUTPATIENT)
Dept: CV DIAGNOSTICS | Facility: HOSPITAL | Age: 84
End: 2019-07-10

## 2019-07-10 DIAGNOSIS — I25.10 ATHEROSCLEROTIC HEART DISEASE OF NATIVE CORONARY ARTERY WITHOUT ANGINA PECTORIS: ICD-10-CM

## 2019-07-10 DIAGNOSIS — Z95.1 PRESENCE OF AORTOCORONARY BYPASS GRAFT: Chronic | ICD-10-CM

## 2019-07-10 PROCEDURE — 78452 HT MUSCLE IMAGE SPECT MULT: CPT | Mod: 26

## 2019-07-10 PROCEDURE — 93016 CV STRESS TEST SUPVJ ONLY: CPT

## 2019-07-10 PROCEDURE — 93018 CV STRESS TEST I&R ONLY: CPT

## 2019-07-11 ENCOUNTER — INPATIENT (INPATIENT)
Facility: HOSPITAL | Age: 84
LOS: 0 days | Discharge: HOME | DRG: 247 | End: 2019-07-12
Attending: INTERNAL MEDICINE | Admitting: INTERNAL MEDICINE
Payer: MEDICARE

## 2019-07-11 VITALS
RESPIRATION RATE: 16 BRPM | TEMPERATURE: 98 F | HEIGHT: 67 IN | SYSTOLIC BLOOD PRESSURE: 138 MMHG | WEIGHT: 214.95 LBS | HEART RATE: 62 BPM | OXYGEN SATURATION: 99 % | DIASTOLIC BLOOD PRESSURE: 60 MMHG

## 2019-07-11 DIAGNOSIS — R94.8 ABNORMAL RESULTS OF FUNCTION STUDIES OF OTHER ORGANS AND SYSTEMS: ICD-10-CM

## 2019-07-11 DIAGNOSIS — I80.10 PHLEBITIS AND THROMBOPHLEBITIS OF UNSPECIFIED FEMORAL VEIN: ICD-10-CM

## 2019-07-11 DIAGNOSIS — Z95.1 PRESENCE OF AORTOCORONARY BYPASS GRAFT: Chronic | ICD-10-CM

## 2019-07-11 DIAGNOSIS — Z98.890 OTHER SPECIFIED POSTPROCEDURAL STATES: Chronic | ICD-10-CM

## 2019-07-11 LAB
ALBUMIN SERPL ELPH-MCNC: 4 G/DL — SIGNIFICANT CHANGE UP (ref 3.3–5)
ALP SERPL-CCNC: 55 U/L — SIGNIFICANT CHANGE UP (ref 40–120)
ALT FLD-CCNC: 10 U/L — SIGNIFICANT CHANGE UP (ref 10–45)
ANION GAP SERPL CALC-SCNC: 15 MMOL/L — SIGNIFICANT CHANGE UP (ref 5–17)
AST SERPL-CCNC: 17 U/L — SIGNIFICANT CHANGE UP (ref 10–40)
BILIRUB SERPL-MCNC: 0.6 MG/DL — SIGNIFICANT CHANGE UP (ref 0.2–1.2)
BUN SERPL-MCNC: 56 MG/DL — HIGH (ref 7–23)
CALCIUM SERPL-MCNC: 9.2 MG/DL — SIGNIFICANT CHANGE UP (ref 8.4–10.5)
CHLORIDE SERPL-SCNC: 102 MMOL/L — SIGNIFICANT CHANGE UP (ref 96–108)
CO2 SERPL-SCNC: 25 MMOL/L — SIGNIFICANT CHANGE UP (ref 22–31)
CREAT SERPL-MCNC: 1.87 MG/DL — HIGH (ref 0.5–1.3)
GLUCOSE SERPL-MCNC: 107 MG/DL — HIGH (ref 70–99)
HCT VFR BLD CALC: 36.5 % — LOW (ref 39–50)
HGB BLD-MCNC: 12.4 G/DL — LOW (ref 13–17)
INR BLD: 1.31 RATIO — HIGH (ref 0.88–1.16)
MCHC RBC-ENTMCNC: 33 PG — SIGNIFICANT CHANGE UP (ref 27–34)
MCHC RBC-ENTMCNC: 33.9 GM/DL — SIGNIFICANT CHANGE UP (ref 32–36)
MCV RBC AUTO: 97.5 FL — SIGNIFICANT CHANGE UP (ref 80–100)
PLATELET # BLD AUTO: 129 K/UL — LOW (ref 150–400)
POTASSIUM SERPL-MCNC: 4.4 MMOL/L — SIGNIFICANT CHANGE UP (ref 3.5–5.3)
POTASSIUM SERPL-SCNC: 4.4 MMOL/L — SIGNIFICANT CHANGE UP (ref 3.5–5.3)
PROT SERPL-MCNC: 7.7 G/DL — SIGNIFICANT CHANGE UP (ref 6–8.3)
PROTHROM AB SERPL-ACNC: 15.2 SEC — HIGH (ref 10–12.9)
RBC # BLD: 3.75 M/UL — LOW (ref 4.2–5.8)
RBC # FLD: 14.7 % — HIGH (ref 10.3–14.5)
SODIUM SERPL-SCNC: 142 MMOL/L — SIGNIFICANT CHANGE UP (ref 135–145)
WBC # BLD: 6.1 K/UL — SIGNIFICANT CHANGE UP (ref 3.8–10.5)
WBC # FLD AUTO: 6.1 K/UL — SIGNIFICANT CHANGE UP (ref 3.8–10.5)

## 2019-07-11 PROCEDURE — 99152 MOD SED SAME PHYS/QHP 5/>YRS: CPT | Mod: GC

## 2019-07-11 PROCEDURE — 93010 ELECTROCARDIOGRAM REPORT: CPT | Mod: 76

## 2019-07-11 PROCEDURE — 92937 PRQ TRLUML REVSC CAB GRF 1: CPT | Mod: RC,GC

## 2019-07-11 PROCEDURE — 93455 CORONARY ART/GRFT ANGIO S&I: CPT | Mod: 26,59,GC

## 2019-07-11 RX ORDER — CLOPIDOGREL BISULFATE 75 MG/1
1 TABLET, FILM COATED ORAL
Qty: 0 | Refills: 0 | DISCHARGE

## 2019-07-11 RX ORDER — AMLODIPINE BESYLATE 2.5 MG/1
5 TABLET ORAL DAILY
Refills: 0 | Status: DISCONTINUED | OUTPATIENT
Start: 2019-07-11 | End: 2019-07-12

## 2019-07-11 RX ORDER — NIACIN 50 MG
500 TABLET ORAL AT BEDTIME
Refills: 0 | Status: DISCONTINUED | OUTPATIENT
Start: 2019-07-11 | End: 2019-07-12

## 2019-07-11 RX ORDER — CARVEDILOL PHOSPHATE 80 MG/1
12.5 CAPSULE, EXTENDED RELEASE ORAL EVERY 12 HOURS
Refills: 0 | Status: DISCONTINUED | OUTPATIENT
Start: 2019-07-11 | End: 2019-07-11

## 2019-07-11 RX ORDER — CARVEDILOL PHOSPHATE 80 MG/1
25 CAPSULE, EXTENDED RELEASE ORAL EVERY 12 HOURS
Refills: 0 | Status: DISCONTINUED | OUTPATIENT
Start: 2019-07-11 | End: 2019-07-12

## 2019-07-11 RX ORDER — FUROSEMIDE 40 MG
40 TABLET ORAL DAILY
Refills: 0 | Status: DISCONTINUED | OUTPATIENT
Start: 2019-07-11 | End: 2019-07-12

## 2019-07-11 RX ORDER — POTASSIUM CHLORIDE 20 MEQ
1 PACKET (EA) ORAL
Qty: 0 | Refills: 0 | DISCHARGE

## 2019-07-11 RX ORDER — ASPIRIN/CALCIUM CARB/MAGNESIUM 324 MG
81 TABLET ORAL DAILY
Refills: 0 | Status: DISCONTINUED | OUTPATIENT
Start: 2019-07-11 | End: 2019-07-12

## 2019-07-11 RX ORDER — FENOFIBRATE,MICRONIZED 130 MG
145 CAPSULE ORAL DAILY
Refills: 0 | Status: DISCONTINUED | OUTPATIENT
Start: 2019-07-11 | End: 2019-07-12

## 2019-07-11 RX ORDER — CLOPIDOGREL BISULFATE 75 MG/1
75 TABLET, FILM COATED ORAL DAILY
Refills: 0 | Status: DISCONTINUED | OUTPATIENT
Start: 2019-07-12 | End: 2019-07-12

## 2019-07-11 RX ORDER — CITALOPRAM 10 MG/1
20 TABLET, FILM COATED ORAL DAILY
Refills: 0 | Status: DISCONTINUED | OUTPATIENT
Start: 2019-07-11 | End: 2019-07-12

## 2019-07-11 RX ORDER — ALPRAZOLAM 0.25 MG
1 TABLET ORAL
Qty: 0 | Refills: 0 | DISCHARGE

## 2019-07-11 RX ORDER — SODIUM CHLORIDE 9 MG/ML
3 INJECTION INTRAMUSCULAR; INTRAVENOUS; SUBCUTANEOUS EVERY 8 HOURS
Refills: 0 | Status: DISCONTINUED | OUTPATIENT
Start: 2019-07-11 | End: 2019-07-12

## 2019-07-11 RX ORDER — LEVOTHYROXINE SODIUM 125 MCG
75 TABLET ORAL DAILY
Refills: 0 | Status: DISCONTINUED | OUTPATIENT
Start: 2019-07-11 | End: 2019-07-12

## 2019-07-11 RX ORDER — CARVEDILOL PHOSPHATE 80 MG/1
1 CAPSULE, EXTENDED RELEASE ORAL
Qty: 0 | Refills: 0 | DISCHARGE

## 2019-07-11 RX ADMIN — CITALOPRAM 20 MILLIGRAM(S): 10 TABLET, FILM COATED ORAL at 17:28

## 2019-07-11 RX ADMIN — SODIUM CHLORIDE 3 MILLILITER(S): 9 INJECTION INTRAMUSCULAR; INTRAVENOUS; SUBCUTANEOUS at 21:37

## 2019-07-11 RX ADMIN — Medication 81 MILLIGRAM(S): at 13:50

## 2019-07-11 RX ADMIN — Medication 500 MILLIGRAM(S): at 21:37

## 2019-07-11 RX ADMIN — SODIUM CHLORIDE 3 MILLILITER(S): 9 INJECTION INTRAMUSCULAR; INTRAVENOUS; SUBCUTANEOUS at 14:44

## 2019-07-11 RX ADMIN — CARVEDILOL PHOSPHATE 25 MILLIGRAM(S): 80 CAPSULE, EXTENDED RELEASE ORAL at 17:28

## 2019-07-11 NOTE — CHART NOTE - NSCHARTNOTEFT_GEN_A_CORE
Removal of Femoral Sheath    Pulses in the (right lower extremity are (palpable. The patient was placed in the supine position. The insertion site was identified and the sutures were removed per protocol.  The _5FA___ Sudanese femoral sheath was then removed. Direct pressure was applied for  20______ minutes.     Monitoring of the (right  groin and both lower extremities including neuro-vascular checks and vital signs every 15 minutes x 4, then every 30 minutes x 2, then every 1 hour was ordered.    Complications: None    Comments:

## 2019-07-11 NOTE — H&P CARDIOLOGY - PMH
CAD (coronary artery disease) of artery bypass graft    Diabetes    DVT (deep venous thrombosis)    HTN (hypertension)    Hypertension    Stented coronary artery    Ventral hernia

## 2019-07-11 NOTE — PATIENT PROFILE ADULT - TYPE OF EQUIPMENT CURRENTLY USED AT HOME
bipap/walker, rolling/"I haven't been using it because it hasn't been working very well, but I am able to sleep without it"/cane, straight

## 2019-07-11 NOTE — CHART NOTE - NSCHARTNOTEFT_GEN_A_CORE
Patient underwent a PCI procedure and is being admitted as they are at increased risk for major adverse cardiac and vascular events if discharged due to the following high risk characteristics:  Unstable angina

## 2019-07-12 ENCOUNTER — TRANSCRIPTION ENCOUNTER (OUTPATIENT)
Age: 84
End: 2019-07-12

## 2019-07-12 VITALS
OXYGEN SATURATION: 95 % | HEART RATE: 69 BPM | SYSTOLIC BLOOD PRESSURE: 100 MMHG | TEMPERATURE: 98 F | DIASTOLIC BLOOD PRESSURE: 55 MMHG | RESPIRATION RATE: 17 BRPM

## 2019-07-12 DIAGNOSIS — I25.10 ATHEROSCLEROTIC HEART DISEASE OF NATIVE CORONARY ARTERY WITHOUT ANGINA PECTORIS: ICD-10-CM

## 2019-07-12 LAB
ANION GAP SERPL CALC-SCNC: 12 MMOL/L — SIGNIFICANT CHANGE UP (ref 5–17)
BUN SERPL-MCNC: 51 MG/DL — HIGH (ref 7–23)
CALCIUM SERPL-MCNC: 9.1 MG/DL — SIGNIFICANT CHANGE UP (ref 8.4–10.5)
CHLORIDE SERPL-SCNC: 103 MMOL/L — SIGNIFICANT CHANGE UP (ref 96–108)
CO2 SERPL-SCNC: 25 MMOL/L — SIGNIFICANT CHANGE UP (ref 22–31)
CREAT SERPL-MCNC: 1.83 MG/DL — HIGH (ref 0.5–1.3)
GLUCOSE SERPL-MCNC: 102 MG/DL — HIGH (ref 70–99)
HCT VFR BLD CALC: 35.5 % — LOW (ref 39–50)
HGB BLD-MCNC: 12.2 G/DL — LOW (ref 13–17)
MCHC RBC-ENTMCNC: 33.4 PG — SIGNIFICANT CHANGE UP (ref 27–34)
MCHC RBC-ENTMCNC: 34.5 GM/DL — SIGNIFICANT CHANGE UP (ref 32–36)
MCV RBC AUTO: 97 FL — SIGNIFICANT CHANGE UP (ref 80–100)
PLATELET # BLD AUTO: 162 K/UL — SIGNIFICANT CHANGE UP (ref 150–400)
POTASSIUM SERPL-MCNC: 3.7 MMOL/L — SIGNIFICANT CHANGE UP (ref 3.5–5.3)
POTASSIUM SERPL-SCNC: 3.7 MMOL/L — SIGNIFICANT CHANGE UP (ref 3.5–5.3)
RBC # BLD: 3.66 M/UL — LOW (ref 4.2–5.8)
RBC # FLD: 14.6 % — HIGH (ref 10.3–14.5)
SODIUM SERPL-SCNC: 140 MMOL/L — SIGNIFICANT CHANGE UP (ref 135–145)
WBC # BLD: 6.6 K/UL — SIGNIFICANT CHANGE UP (ref 3.8–10.5)
WBC # FLD AUTO: 6.6 K/UL — SIGNIFICANT CHANGE UP (ref 3.8–10.5)

## 2019-07-12 PROCEDURE — C9604: CPT | Mod: RC

## 2019-07-12 PROCEDURE — 78452 HT MUSCLE IMAGE SPECT MULT: CPT

## 2019-07-12 PROCEDURE — A9505: CPT

## 2019-07-12 PROCEDURE — C1887: CPT

## 2019-07-12 PROCEDURE — 99153 MOD SED SAME PHYS/QHP EA: CPT

## 2019-07-12 PROCEDURE — 85610 PROTHROMBIN TIME: CPT

## 2019-07-12 PROCEDURE — C1874: CPT

## 2019-07-12 PROCEDURE — 99152 MOD SED SAME PHYS/QHP 5/>YRS: CPT

## 2019-07-12 PROCEDURE — 80048 BASIC METABOLIC PNL TOTAL CA: CPT

## 2019-07-12 PROCEDURE — C1894: CPT

## 2019-07-12 PROCEDURE — 85027 COMPLETE CBC AUTOMATED: CPT

## 2019-07-12 PROCEDURE — 93455 CORONARY ART/GRFT ANGIO S&I: CPT | Mod: 59

## 2019-07-12 PROCEDURE — 93005 ELECTROCARDIOGRAM TRACING: CPT

## 2019-07-12 PROCEDURE — C1769: CPT

## 2019-07-12 PROCEDURE — A9500: CPT

## 2019-07-12 PROCEDURE — 93017 CV STRESS TEST TRACING ONLY: CPT

## 2019-07-12 PROCEDURE — 80053 COMPREHEN METABOLIC PANEL: CPT

## 2019-07-12 PROCEDURE — 94660 CPAP INITIATION&MGMT: CPT

## 2019-07-12 RX ORDER — CLOPIDOGREL BISULFATE 75 MG/1
1 TABLET, FILM COATED ORAL
Qty: 90 | Refills: 3
Start: 2019-07-12 | End: 2020-07-05

## 2019-07-12 RX ADMIN — CLOPIDOGREL BISULFATE 75 MILLIGRAM(S): 75 TABLET, FILM COATED ORAL at 05:26

## 2019-07-12 RX ADMIN — Medication 81 MILLIGRAM(S): at 05:27

## 2019-07-12 RX ADMIN — Medication 145 MILLIGRAM(S): at 12:01

## 2019-07-12 RX ADMIN — Medication 40 MILLIGRAM(S): at 05:26

## 2019-07-12 RX ADMIN — CITALOPRAM 20 MILLIGRAM(S): 10 TABLET, FILM COATED ORAL at 12:01

## 2019-07-12 RX ADMIN — CARVEDILOL PHOSPHATE 25 MILLIGRAM(S): 80 CAPSULE, EXTENDED RELEASE ORAL at 05:26

## 2019-07-12 RX ADMIN — SODIUM CHLORIDE 3 MILLILITER(S): 9 INJECTION INTRAMUSCULAR; INTRAVENOUS; SUBCUTANEOUS at 05:23

## 2019-07-12 RX ADMIN — Medication 75 MICROGRAM(S): at 05:26

## 2019-07-12 RX ADMIN — SODIUM CHLORIDE 3 MILLILITER(S): 9 INJECTION INTRAMUSCULAR; INTRAVENOUS; SUBCUTANEOUS at 12:36

## 2019-07-12 RX ADMIN — AMLODIPINE BESYLATE 5 MILLIGRAM(S): 2.5 TABLET ORAL at 05:26

## 2019-07-12 NOTE — DISCHARGE NOTE PROVIDER - NSDCCPCAREPLAN_GEN_ALL_CORE_FT
PRINCIPAL DISCHARGE DIAGNOSIS  Diagnosis: Status post coronary artery stent placement  Assessment and Plan of Treatment: s/p stent to your SVG to RCA  No heavy lifting, strenuous activity, bending, straining, or unnecessary stair climbing for 2 weeks. No driving for 2 days. You may shower 24 hours following the procedure but avoid baths/swimming for 1 week. Check your groin site for bleeding and/or swelling daily following procedure and call your doctor immediately if it occurs or if you experience increased pain at the site. Follow up with your cardiologist in 1-2 weeks. You may call [facility] if you have any questions/concerns regarding your procedure [phone]      SECONDARY DISCHARGE DIAGNOSES  Diagnosis: Diabetes mellitus, controlled  Assessment and Plan of Treatment: May restart your Metformin on 7/14/19    Diagnosis: H/O ventral hernia  Assessment and Plan of Treatment: Follow up with your surgeon for when this can be repaired.  You are now taking Aspirin, Plavix and Eliquis with recent stent you have to have Cardiac clearance as to when the Plavix can be stopped safely to have surgery.    Diagnosis: DVT, lower extremity  Assessment and Plan of Treatment: Continue your Eliquis will restart today at noon    Diagnosis: CAD (coronary artery disease)  Assessment and Plan of Treatment: Low salt, low fat diet.   Weight management.   Take medications as prescribed.    No smoking.  Follow up appointments with your doctor(s)  as instructed.

## 2019-07-12 NOTE — PROGRESS NOTE ADULT - SUBJECTIVE AND OBJECTIVE BOX
Patient is a 89y old  Male who presents with a chief complaint of Positive stress test (2019 04:36)          Allergies: ACE inhibitors (Other) penicillin (Rash)    Intolerances: Lipitor (Muscle Pain)      Medications:  amLODIPine   Tablet 5 milliGRAM(s) Oral daily  aspirin  chewable 81 milliGRAM(s) Oral daily  carvedilol 25 milliGRAM(s) Oral every 12 hours  citalopram 20 milliGRAM(s) Oral daily  clopidogrel Tablet 75 milliGRAM(s) Oral daily  fenofibrate Tablet 145 milliGRAM(s) Oral daily  furosemide    Tablet 40 milliGRAM(s) Oral daily  levothyroxine 75 MICROGram(s) Oral daily  niacin  milliGRAM(s) Oral at bedtime  sodium chloride 0.9% lock flush 3 milliLiter(s) IV Push every 8 hours      Vitals:  T(C): 36.5 (19 @ 05:19), Max: 36.7 (19 @ 20:00)  HR: 62 (19 @ 07:20) (50 - 76)  BP: 157/54 (19 @ 07:20) (114/41 - 163/50)  BP(mean): 86 (19 @ 10:19) (86 - 86)  RR: 17 (19 @ 07:20) (16 - 18)  SpO2: 97% (19 @ 07:20) (95% - 100%)  Wt(kg): --  Daily Height in cm: 170.18 (2019 13:05)    Daily Weight in k.5 (2019 10:19)  I&O's Summary    2019 07:01  -  2019 07:00  --------------------------------------------------------  IN: 300 mL / OUT: 0 mL / NET: 300 mL      Physical Exam:  Appearance: Normal  Eyes: PERRL, EOMI  HENT: Normal oral muscosa, NC/AT  Cardiovascular: S1S2, RRR, No M/R/G, no JVD, No Lower extremity edema  Procedural Access Site: No hematoma, Non-tender to palpation, 2+ pulse, No bruit, No Ecchymosis  Respiratory: Clear to auscultation bilaterally  Gastrointestinal: Soft, Non tender, Normal Bowel Sounds  Musculoskeletal: No clubbing, No joint deformity   Neurologic: Non-focal  Lymphatic: No lymphadenopathy  Psychiatry: AAOx3, Mood & affect appropriate  Skin: No rashes, No ecchymoses, No cyanosis        140  |  103  |  51<H>  ----------------------------<  102<H>  3.7   |  25  |  1.83<H>    Ca    9.1      2019 04:53    TPro  7.7  /  Alb  4.0  /  TBili  0.6  /  DBili  x   /  AST  17  /  ALT  10  /  AlkPhos  55  07-11    PT/INR - ( 2019 10:34 )   PT: 15.2 sec;   INR: 1.31 ratio      Lipid panel   Hgb A1c   ECG:    Echo:    Stress Testing:     Cath: < from: Cardiac Cath Lab - Adult (19 @ 11:35) >  1. Successful PCI to the prox 1/3 SVG to the RPDA with a 4.0mm Ashutosh ASHLEY in the setting of a significantly positive nuclear stress test.  2. DAPT.  INTERVENTIONAL RECOMMENDATIONS:  1. Successful PCI to the prox 1/3 SVG to the RPDA with a 4.0mm Ashutosh ASHLEY in the setting of a significantly positive nuclear stress test.  2. DAPT. < end of copied text >    Interpretation of Telemetry: SR @ 60's with 1 AVB

## 2019-07-12 NOTE — DISCHARGE NOTE NURSING/CASE MANAGEMENT/SOCIAL WORK - NSDCDPATPORTLINK_GEN_ALL_CORE
You can access the [facility] Patient Portal, offered by [facility], by registering with the following website: [email or web address]

## 2019-07-12 NOTE — PROGRESS NOTE ADULT - ASSESSMENT
1. CAD s/p stent prox 1/3 SVG to the RPDA with a 4.0mm Ashutosh ASHLEY  Continue ASA, Plavix and statin, f/u with cardiologist in 2 weeks  ASA, Plavix and Eliquis for 2 weeks then Plavix & Eliquis to be continued  2. DVT LE   Continue Eliquis 2.5 mg   3. ventral hernia  F/u with Dr. Mcnamara

## 2019-07-12 NOTE — DISCHARGE NOTE PROVIDER - CARE PROVIDERS DIRECT ADDRESSES
[email or web address]

## 2019-07-12 NOTE — DISCHARGE NOTE PROVIDER - NSDCFUADDINST_GEN_ALL_CORE_FT
You will take your Aspirin, Plavix and Eliquis for 2 weeks.  Then you will take Plavix and Eliquis alone.

## 2019-07-12 NOTE — DISCHARGE NOTE PROVIDER - CARE PROVIDER_API CALL
Jose Howard)  Cardiology; Internal Medicine  [address] [place] [address]  Phone: [phone]  Fax: [phone]  Follow Up Time: 2 weeks Jose Howard (MD)  Cardiology; Internal Medicine  [address] [place] [address]  Phone: [phone]  Fax: [phone]  Follow Up Time: 2 weeks    Roque Mcnamara)  Surgery  [address] [place] [address], [place] [address] [place] [address]  Phone: [phone]  Fax: [phone]  Follow Up Time:

## 2019-07-12 NOTE — DISCHARGE NOTE NURSING/CASE MANAGEMENT/SOCIAL WORK - NSDCPEWEB_GEN_ALL_CORE
[facility] for Tobacco Control website --- [email or web address] website --- [email or web address]

## 2019-07-12 NOTE — DISCHARGE NOTE PROVIDER - NSDCFUADDAPPT_GEN_ALL_CORE_FT
Follow up with your Cardiologist and your surgeon.  They will have to coordinate when it is safe for you to have your surgery after you had a stent placed in your heart.

## 2019-07-12 NOTE — DISCHARGE NOTE PROVIDER - HOSPITAL COURSE
HPI:    89 y/o  male no implanted device PMHx of CAD s/p CABG, complicated by perforated peptic ulcer s/p prolonged hospitalization back in 1990s, HTN, diabetes (diet controlled, no longer on Metformin, last HgbA1C 6%), left LE DVT in 5/2019 on Eliquis (last dose 2 days ago) presents for cardiac clearance for ventral hernia surgery. Pt reports he gets SOB occasionally but denies chest pain.         Surgeon Roque Mcnamara    Cardiologist: Becka in [facility]    < from: Transthoracic Echocardiogram (05.28.19 @ 09:20) >    Conclusions:    EF 35%    1. Mitral annular calcification with calcified chordae. Calcified and tethered mitral valve leaflets. Mild mitral regurgitation.    2. Aortic valve not well visualized; appears calcified with decreased opening. Peak transaortic valve gradient equals 19 mm Hg, mean transaortic valve gradient equals 11 mm Hg, estimated aortic valve area equals 1.3 sqcm (by continuity equation), aortic valve velocity time integral equals 55 cm, consistent with low gradient moderate aortic stenosis. Minimal aortic regurgitation. 3. Endocardium not well visualized; moderate left ventricular systolic dysfunction. The inferolateral and lateral walls are hypokinetic. Paradoxical septal motion consistent with conduction defect.    4. Mild diastolic dysfunction (Stage I). 5. The right ventricle is not well visualized; grossly normal right ventricular size with decreased systolic function. *** No previous Echo exam. < end of copied text >    < from: Transthoracic Echocardiogram (05.28.19 @ 09:20) >        < from: Nuclear Stress Test-Pharmacologic (07.10.19 @ 11:04) >    * Conduction defects: Left bundle branch block.    * ECG Abnormalities: ECG changes could not be interpreted due to bundle branch block.    * Arrhythmia: Occasional VPDs occurred during rest, stress and recovery.    * There are large, moderate defects in the mid to distal anterior, mid to distal anteroseptal, distal anterolateral, and apical walls that are reversible suggestive of ischemia.    * There are large, moderate to severe defects in the lateral wall that are partially reversible suggestive of infarct with moderate laura-infarct ischemia.    * There are medium-sized, moderate to severe defects in the basal inferior and basal to mid inferoseptal walls that are partially reversible suggestive of infarct with moderate laura-infarct ischemia.    * Post-stress gated wall motion analysis was performed (LVEF = 35 %;LVEDV = 122 ml.) revealing hypokinesis of the lateral, basal inferior, and basal to mid inferoseptal walls and reduced systolic thickening of the mid to distal anterior, mid to distal anteroseptal, distal anterolateral, and apical walls.  There is paradoxical septal motion consistent with bundle branch block and/or a post-operative state. *** No previous Nuclear/Stress exam. < end of copied text > (11 Jul 2019 10:19)            Pt s/p PCI/ASHLEY SVG to RCA via RFA.  Pt with no tele events, site complications.  Will be on Aspirin, Plavix and Eliquis for 2 weeks then will continue Plavix and Eliquis only.  Eliquis will be resumed today at noon.  Pt to followup with Dr. Jose Howard in 1-2 weeks.  Pt tolerated procedure well with no post cath complications and hospitalization remained uneventful. Pt stable for discharge home.

## 2019-07-12 NOTE — DISCHARGE NOTE PROVIDER - PROVIDER TOKENS
PROVIDER:[TOKEN:[[id number]],FOLLOWUP:[2 weeks]] PROVIDER:[TOKEN:[[id number]],FOLLOWUP:[2 weeks]],PROVIDER:[TOKEN:[[id number]]]

## 2019-07-12 NOTE — DISCHARGE NOTE PROVIDER - NSDCCPTREATMENT_GEN_ALL_CORE_FT
PRINCIPAL PROCEDURE  Procedure: Placement of coronary artery stent  Findings and Treatment: s/p PCI with liliana stent to your SVG to RCA

## 2019-11-01 NOTE — DISCHARGE NOTE NURSING/CASE MANAGEMENT/SOCIAL WORK - NSDCPEFALRISK_GEN_ALL_CORE
Patient information on fall and injury prevention Xenograft Text: The defect edges were debeveled with a #15 scalpel blade.  Given the location of the defect, shape of the defect and the proximity to free margins a xenograft was deemed most appropriate.  The graft was then trimmed to fit the size of the defect.  The graft was then placed in the primary defect and oriented appropriately.

## 2019-11-18 ENCOUNTER — EMERGENCY (EMERGENCY)
Facility: HOSPITAL | Age: 84
LOS: 1 days | Discharge: HOME | End: 2019-11-18
Attending: STUDENT IN AN ORGANIZED HEALTH CARE EDUCATION/TRAINING PROGRAM
Payer: MEDICARE

## 2019-11-18 VITALS
SYSTOLIC BLOOD PRESSURE: 174 MMHG | HEART RATE: 70 BPM | DIASTOLIC BLOOD PRESSURE: 76 MMHG | RESPIRATION RATE: 18 BRPM | OXYGEN SATURATION: 95 % | TEMPERATURE: 98 F

## 2019-11-18 VITALS
TEMPERATURE: 98 F | HEART RATE: 72 BPM | WEIGHT: 220.02 LBS | OXYGEN SATURATION: 100 % | DIASTOLIC BLOOD PRESSURE: 76 MMHG | RESPIRATION RATE: 16 BRPM | SYSTOLIC BLOOD PRESSURE: 150 MMHG | HEIGHT: 66 IN

## 2019-11-18 DIAGNOSIS — Z95.1 PRESENCE OF AORTOCORONARY BYPASS GRAFT: Chronic | ICD-10-CM

## 2019-11-18 DIAGNOSIS — Z98.890 OTHER SPECIFIED POSTPROCEDURAL STATES: Chronic | ICD-10-CM

## 2019-11-18 PROBLEM — I82.409 ACUTE EMBOLISM AND THROMBOSIS OF UNSPECIFIED DEEP VEINS OF UNSPECIFIED LOWER EXTREMITY: Chronic | Status: ACTIVE | Noted: 2019-07-11

## 2019-11-18 LAB
ALBUMIN SERPL ELPH-MCNC: 3.8 G/DL — SIGNIFICANT CHANGE UP (ref 3.3–5)
ALP SERPL-CCNC: 38 U/L — LOW (ref 40–120)
ALT FLD-CCNC: 12 U/L — SIGNIFICANT CHANGE UP (ref 10–45)
ANION GAP SERPL CALC-SCNC: 13 MMOL/L — SIGNIFICANT CHANGE UP (ref 5–17)
APPEARANCE UR: CLEAR — SIGNIFICANT CHANGE UP
AST SERPL-CCNC: 20 U/L — SIGNIFICANT CHANGE UP (ref 10–40)
BACTERIA # UR AUTO: NEGATIVE — SIGNIFICANT CHANGE UP
BASOPHILS # BLD AUTO: 0.01 K/UL — SIGNIFICANT CHANGE UP (ref 0–0.2)
BASOPHILS NFR BLD AUTO: 0.2 % — SIGNIFICANT CHANGE UP (ref 0–2)
BILIRUB SERPL-MCNC: 0.5 MG/DL — SIGNIFICANT CHANGE UP (ref 0.2–1.2)
BILIRUB UR-MCNC: NEGATIVE — SIGNIFICANT CHANGE UP
BUN SERPL-MCNC: 33 MG/DL — HIGH (ref 7–23)
CALCIUM SERPL-MCNC: 9 MG/DL — SIGNIFICANT CHANGE UP (ref 8.4–10.5)
CHLORIDE SERPL-SCNC: 105 MMOL/L — SIGNIFICANT CHANGE UP (ref 96–108)
CO2 SERPL-SCNC: 22 MMOL/L — SIGNIFICANT CHANGE UP (ref 22–31)
COLOR SPEC: SIGNIFICANT CHANGE UP
CREAT SERPL-MCNC: 1.78 MG/DL — HIGH (ref 0.5–1.3)
DIFF PNL FLD: ABNORMAL
EOSINOPHIL # BLD AUTO: 0.14 K/UL — SIGNIFICANT CHANGE UP (ref 0–0.5)
EOSINOPHIL NFR BLD AUTO: 2.4 % — SIGNIFICANT CHANGE UP (ref 0–6)
EPI CELLS # UR: 4 /HPF — SIGNIFICANT CHANGE UP
GLUCOSE SERPL-MCNC: 94 MG/DL — SIGNIFICANT CHANGE UP (ref 70–99)
GLUCOSE UR QL: NEGATIVE — SIGNIFICANT CHANGE UP
HCT VFR BLD CALC: 34.9 % — LOW (ref 39–50)
HGB BLD-MCNC: 10.8 G/DL — LOW (ref 13–17)
HYALINE CASTS # UR AUTO: 2 /LPF — SIGNIFICANT CHANGE UP (ref 0–2)
IMM GRANULOCYTES NFR BLD AUTO: 0.2 % — SIGNIFICANT CHANGE UP (ref 0–1.5)
KETONES UR-MCNC: NEGATIVE — SIGNIFICANT CHANGE UP
LEUKOCYTE ESTERASE UR-ACNC: NEGATIVE — SIGNIFICANT CHANGE UP
LYMPHOCYTES # BLD AUTO: 1.59 K/UL — SIGNIFICANT CHANGE UP (ref 1–3.3)
LYMPHOCYTES # BLD AUTO: 27.7 % — SIGNIFICANT CHANGE UP (ref 13–44)
MCHC RBC-ENTMCNC: 30.1 PG — SIGNIFICANT CHANGE UP (ref 27–34)
MCHC RBC-ENTMCNC: 30.9 GM/DL — LOW (ref 32–36)
MCV RBC AUTO: 97.2 FL — SIGNIFICANT CHANGE UP (ref 80–100)
MONOCYTES # BLD AUTO: 0.53 K/UL — SIGNIFICANT CHANGE UP (ref 0–0.9)
MONOCYTES NFR BLD AUTO: 9.2 % — SIGNIFICANT CHANGE UP (ref 2–14)
NEUTROPHILS # BLD AUTO: 3.47 K/UL — SIGNIFICANT CHANGE UP (ref 1.8–7.4)
NEUTROPHILS NFR BLD AUTO: 60.3 % — SIGNIFICANT CHANGE UP (ref 43–77)
NITRITE UR-MCNC: NEGATIVE — SIGNIFICANT CHANGE UP
NRBC # BLD: 0 /100 WBCS — SIGNIFICANT CHANGE UP (ref 0–0)
PH UR: 6 — SIGNIFICANT CHANGE UP (ref 5–8)
PLATELET # BLD AUTO: 212 K/UL — SIGNIFICANT CHANGE UP (ref 150–400)
POTASSIUM SERPL-MCNC: 4.2 MMOL/L — SIGNIFICANT CHANGE UP (ref 3.5–5.3)
POTASSIUM SERPL-SCNC: 4.2 MMOL/L — SIGNIFICANT CHANGE UP (ref 3.5–5.3)
PROT SERPL-MCNC: 7.6 G/DL — SIGNIFICANT CHANGE UP (ref 6–8.3)
PROT UR-MCNC: ABNORMAL
RBC # BLD: 3.59 M/UL — LOW (ref 4.2–5.8)
RBC # FLD: 16.8 % — HIGH (ref 10.3–14.5)
RBC CASTS # UR COMP ASSIST: 15 /HPF — HIGH (ref 0–4)
SODIUM SERPL-SCNC: 140 MMOL/L — SIGNIFICANT CHANGE UP (ref 135–145)
SP GR SPEC: 1.03 — HIGH (ref 1.01–1.02)
UROBILINOGEN FLD QL: NEGATIVE — SIGNIFICANT CHANGE UP
WBC # BLD: 5.75 K/UL — SIGNIFICANT CHANGE UP (ref 3.8–10.5)
WBC # FLD AUTO: 5.75 K/UL — SIGNIFICANT CHANGE UP (ref 3.8–10.5)
WBC UR QL: 7 /HPF — HIGH (ref 0–5)

## 2019-11-18 PROCEDURE — 80053 COMPREHEN METABOLIC PANEL: CPT

## 2019-11-18 PROCEDURE — 85027 COMPLETE CBC AUTOMATED: CPT

## 2019-11-18 PROCEDURE — 87086 URINE CULTURE/COLONY COUNT: CPT

## 2019-11-18 PROCEDURE — 74177 CT ABD & PELVIS W/CONTRAST: CPT

## 2019-11-18 PROCEDURE — 81001 URINALYSIS AUTO W/SCOPE: CPT

## 2019-11-18 PROCEDURE — 74177 CT ABD & PELVIS W/CONTRAST: CPT | Mod: 26

## 2019-11-18 PROCEDURE — 99284 EMERGENCY DEPT VISIT MOD MDM: CPT | Mod: GC

## 2019-11-18 PROCEDURE — 99284 EMERGENCY DEPT VISIT MOD MDM: CPT | Mod: 25

## 2019-11-18 RX ORDER — LIDOCAINE 4 G/100G
1 CREAM TOPICAL ONCE
Refills: 0 | Status: COMPLETED | OUTPATIENT
Start: 2019-11-18 | End: 2019-11-18

## 2019-11-18 RX ORDER — ACETAMINOPHEN 500 MG
650 TABLET ORAL ONCE
Refills: 0 | Status: COMPLETED | OUTPATIENT
Start: 2019-11-18 | End: 2019-11-18

## 2019-11-18 RX ORDER — IBUPROFEN 200 MG
400 TABLET ORAL ONCE
Refills: 0 | Status: COMPLETED | OUTPATIENT
Start: 2019-11-18 | End: 2019-11-18

## 2019-11-18 RX ADMIN — Medication 650 MILLIGRAM(S): at 16:18

## 2019-11-18 RX ADMIN — Medication 400 MILLIGRAM(S): at 18:31

## 2019-11-18 RX ADMIN — LIDOCAINE 1 PATCH: 4 CREAM TOPICAL at 18:31

## 2019-11-18 NOTE — ED PROVIDER NOTE - PHYSICAL EXAMINATION
*GEN:   comfortable, in no acute distress, AOx3  *EYES:   pupils equally round and reactive to light, extra-occular movements intact  *HEENT:   airway patent, moist mucosal membranes  *CV:   regular rate and rhythm  *RESP:   clear to auscultation bilaterally, non-labored  *ABD:   large para-umbilical hernia, mildly tender to palpation  *:   no cva/flank tenderness  *EXTREM:   no MSK tenderness, full ROM throughout, no leg swelling  *SKIN:   dry, intact  *NEURO:   AOx3, cranial nerves intact throughout, strength 5/5, no focal loss of sensation, no pronator drift, finger/nose normal, ambulating w/ normal gait

## 2019-11-18 NOTE — ED PROVIDER NOTE - OBJECTIVE STATEMENT
89M w/ pmh HTN, DM, DVT (on eliquis), CAD s/p CABG, h/o perforated peptic ulcer - p/w low back pain radiating to low mid abd since yesterday. No fever, n/v/d/c, chest pain, cough, sob, dizziness, dysuria/hematuria. No recent travel, medication change, illness, or hospitalization. No recent injury. No decr sensation / pain to bilat LE.     Is on ciprofloxacin for UTI, will finish one week course tomorrow.

## 2019-11-18 NOTE — ED PROVIDER NOTE - ATTENDING CONTRIBUTION TO CARE
89 M w/ PMH of BPH, recurrent uti, currently on cipro, presents to the ED w/ low back pain, no hx of fall, currently on plavix, no fevers, no chills, no nausea no vomiting.   On exam const: Well-nourished, Well-developed, Eyes: no conjunctival injection and no scleral icterus ENMT: Moist mucus membranes, CVS: +S1/S2, radial/DP pulse 2+ bilaterally  RESP: Unlabored respiratory effort, Clear to auscultation bilaterally GI: has a large ventral abdominal hernia that is reducible, soft abdomen, no CVA tenderness MSK: Extremities w/o deformity or ttp, Back: lumbosacral tenderness; Psych: Awake, Alert, & Orientedx3;  Appropriate mood and affect, cooperative    Plan for labs, and ct scan to eval for occult fx, w/ urinalysis,    Unclear eitiology of symptoms, case signed out to Dr. Babcock pending ct and ambulation, pt is accompanied w/ son,

## 2019-11-18 NOTE — ED ADULT NURSE NOTE - NSIMPLEMENTINTERV_GEN_ALL_ED
Implemented All Fall Risk Interventions:  West to call system. Call bell, personal items and telephone within reach. Instruct patient to call for assistance. Room bathroom lighting operational. Non-slip footwear when patient is off stretcher. Physically safe environment: no spills, clutter or unnecessary equipment. Stretcher in lowest position, wheels locked, appropriate side rails in place. Provide visual cue, wrist band, yellow gown, etc. Monitor gait and stability. Monitor for mental status changes and reorient to person, place, and time. Review medications for side effects contributing to fall risk. Reinforce activity limits and safety measures with patient and family.

## 2019-11-18 NOTE — ED ADULT NURSE NOTE - OBJECTIVE STATEMENT
89 yo male presents to ED from home c/o low back pain radiating to low mid abdomen since yesterday. Patient recent treated with cipro for UTI. Patient denies burning with urination, SOB, CP, nvd, fever/chills, sick contacts, falls/loc. Patient resting in bed, plan of care explained. Family at bedside.

## 2019-11-18 NOTE — ED PROVIDER NOTE - CLINICAL SUMMARY MEDICAL DECISION MAKING FREE TEXT BOX
well appearing 89M w/ low back and low abd pain - low concern for acute pathology but will check CTAP, urine, basics, give sx relief, reassess

## 2019-11-18 NOTE — ED PROVIDER NOTE - NSFOLLOWUPINSTRUCTIONS_ED_ALL_ED_FT
Follow up with your primary care doctor within the next 3-5 days.     Please take ACETAMINOPHEN and IBUPROFEN as directed for your pain. Follow up with your primary care doctor within the next 3-5 days, for your low back pain and blood in your urine.    Please take ACETAMINOPHEN and IBUPROFEN as directed for your pain.

## 2019-11-18 NOTE — ED PROVIDER NOTE - PATIENT PORTAL LINK FT
You can access the FollowMyHealth Patient Portal offered by [facility] by registering at the following website: [email or web address]. By joining [employer]’s FollowMyHealth portal, you will also be able to view your health information using other applications (apps) compatible with our system.

## 2019-11-19 LAB
CULTURE RESULTS: NO GROWTH — SIGNIFICANT CHANGE UP
SPECIMEN SOURCE: SIGNIFICANT CHANGE UP

## 2019-12-04 NOTE — H&P CARDIOLOGY - HISTORY OF PRESENT ILLNESS
89 y/o  male no implanted device PMHx of CAD s/p CABG, complicated by perforated peptic ulcer s/p prolonged hospitalization back in 1990s, HTN, diabetes (diet controlled, no longer on Metformin, last HgbA1C 6%), left LE DVT in 5/2019 on Eliquis (last dose 2 days ago) presents for cardiac clearance for ventral hernia surgery. Pt reports he gets SOB occasionally but denies chest pain.     Surgeon Roque Mcnamara  Cardi[facility]: [facility]  < from: Transthoracic Echocardiogram (05.28.19 @ 09:20) >  Conclusions:  EF 35%  1. Mitral annular calcification with calcified chordae. Calcified and tethered mitral valve leaflets. Mild mitral regurgitation.  2. Aortic valve not well visualized; appears calcified with decreased opening. Peak transaortic valve gradient equals 19 mm Hg, mean transaortic valve gradient equals 11 mm Hg, estimated aortic valve area equals 1.3 sqcm (by continuity equation), aortic valve velocity time integral equals 55 cm, consistent with low gradient moderate aortic stenosis. Minimal aortic regurgitation. 3. Endocardium not well visualized; moderate left ventricular systolic dysfunction. The inferolateral and lateral walls are hypokinetic. Paradoxical septal motion consistent with conduction defect.  4. Mild diastolic dysfunction (Stage I). 5. The right ventricle is not well visualized; grossly normal right ventricular size with decreased systolic function. *** No previous Echo exam. < end of copied text >  < from: Transthoracic Echocardiogram (05.28.19 @ 09:20) >    < from: Nuclear Stress Test-Pharmacologic (07.10.19 @ 11:04) >  * Conduction defects: Left bundle branch block.  * ECG Abnormalities: ECG changes could not be interpreted due to bundle branch block.  * Arrhythmia: Occasional VPDs occurred during rest, stress and recovery.  * There are large, moderate defects in the mid to distal anterior, mid to distal anteroseptal, distal anterolateral, and apical walls that are reversible suggestive of ischemia.  * There are large, moderate to severe defects in the lateral wall that are partially reversible suggestive of infarct with moderate laura-infarct ischemia.  * There are medium-sized, moderate to severe defects in the basal inferior and basal to mid inferoseptal walls that are partially reversible suggestive of infarct with moderate laura-infarct ischemia.  * Post-stress gated wall motion analysis was performed (LVEF = 35 %;LVEDV = 122 ml.) revealing hypokinesis of the lateral, basal inferior, and basal to mid inferoseptal walls and reduced systolic thickening of the mid to distal anterior, mid to distal anteroseptal, distal anterolateral, and apical walls.  There is paradoxical septal motion consistent with bundle branch block and/or a post-operative state. *** No previous Nuclear/Stress exam. < end of copied text > no

## 2020-01-21 ENCOUNTER — EMERGENCY (EMERGENCY)
Facility: HOSPITAL | Age: 85
LOS: 1 days | Discharge: HOME | End: 2020-01-21
Attending: STUDENT IN AN ORGANIZED HEALTH CARE EDUCATION/TRAINING PROGRAM
Payer: MEDICARE

## 2020-01-21 VITALS
RESPIRATION RATE: 20 BRPM | TEMPERATURE: 98 F | DIASTOLIC BLOOD PRESSURE: 70 MMHG | OXYGEN SATURATION: 98 % | HEART RATE: 66 BPM | SYSTOLIC BLOOD PRESSURE: 178 MMHG

## 2020-01-21 VITALS
HEIGHT: 66.5 IN | DIASTOLIC BLOOD PRESSURE: 74 MMHG | OXYGEN SATURATION: 99 % | HEART RATE: 61 BPM | WEIGHT: 214.95 LBS | RESPIRATION RATE: 18 BRPM | SYSTOLIC BLOOD PRESSURE: 151 MMHG | TEMPERATURE: 98 F

## 2020-01-21 DIAGNOSIS — Z98.890 OTHER SPECIFIED POSTPROCEDURAL STATES: Chronic | ICD-10-CM

## 2020-01-21 DIAGNOSIS — Z95.1 PRESENCE OF AORTOCORONARY BYPASS GRAFT: Chronic | ICD-10-CM

## 2020-01-21 LAB
ALBUMIN SERPL ELPH-MCNC: 3.8 G/DL — SIGNIFICANT CHANGE UP (ref 3.3–5)
ALP SERPL-CCNC: 50 U/L — SIGNIFICANT CHANGE UP (ref 40–120)
ALT FLD-CCNC: 12 U/L — SIGNIFICANT CHANGE UP (ref 10–45)
ANION GAP SERPL CALC-SCNC: 13 MMOL/L — SIGNIFICANT CHANGE UP (ref 5–17)
APPEARANCE UR: CLEAR — SIGNIFICANT CHANGE UP
APTT BLD: 35.7 SEC — SIGNIFICANT CHANGE UP (ref 27.5–36.3)
AST SERPL-CCNC: 23 U/L — SIGNIFICANT CHANGE UP (ref 10–40)
BACTERIA # UR AUTO: NEGATIVE — SIGNIFICANT CHANGE UP
BASE EXCESS BLDV CALC-SCNC: -0.3 MMOL/L — SIGNIFICANT CHANGE UP (ref -2–2)
BASOPHILS # BLD AUTO: 0.02 K/UL — SIGNIFICANT CHANGE UP (ref 0–0.2)
BASOPHILS NFR BLD AUTO: 0.3 % — SIGNIFICANT CHANGE UP (ref 0–2)
BILIRUB SERPL-MCNC: 0.7 MG/DL — SIGNIFICANT CHANGE UP (ref 0.2–1.2)
BILIRUB UR-MCNC: NEGATIVE — SIGNIFICANT CHANGE UP
BUN SERPL-MCNC: 36 MG/DL — HIGH (ref 7–23)
CA-I SERPL-SCNC: 1.23 MMOL/L — SIGNIFICANT CHANGE UP (ref 1.12–1.3)
CALCIUM SERPL-MCNC: 9.1 MG/DL — SIGNIFICANT CHANGE UP (ref 8.4–10.5)
CHLORIDE BLDV-SCNC: 109 MMOL/L — HIGH (ref 96–108)
CHLORIDE SERPL-SCNC: 106 MMOL/L — SIGNIFICANT CHANGE UP (ref 96–108)
CO2 BLDV-SCNC: 26 MMOL/L — SIGNIFICANT CHANGE UP (ref 22–30)
CO2 SERPL-SCNC: 21 MMOL/L — LOW (ref 22–31)
COLOR SPEC: SIGNIFICANT CHANGE UP
CREAT SERPL-MCNC: 1.67 MG/DL — HIGH (ref 0.5–1.3)
DIFF PNL FLD: NEGATIVE — SIGNIFICANT CHANGE UP
EOSINOPHIL # BLD AUTO: 0.17 K/UL — SIGNIFICANT CHANGE UP (ref 0–0.5)
EOSINOPHIL NFR BLD AUTO: 2.9 % — SIGNIFICANT CHANGE UP (ref 0–6)
EPI CELLS # UR: 0 /HPF — SIGNIFICANT CHANGE UP
GAS PNL BLDV: 139 MMOL/L — SIGNIFICANT CHANGE UP (ref 135–145)
GAS PNL BLDV: SIGNIFICANT CHANGE UP
GLUCOSE BLDV-MCNC: 102 MG/DL — HIGH (ref 70–99)
GLUCOSE SERPL-MCNC: 109 MG/DL — HIGH (ref 70–99)
GLUCOSE UR QL: NEGATIVE — SIGNIFICANT CHANGE UP
HCO3 BLDV-SCNC: 25 MMOL/L — SIGNIFICANT CHANGE UP (ref 21–29)
HCT VFR BLD CALC: 39.2 % — SIGNIFICANT CHANGE UP (ref 39–50)
HCT VFR BLDA CALC: 40 % — SIGNIFICANT CHANGE UP (ref 39–50)
HGB BLD CALC-MCNC: 13 G/DL — SIGNIFICANT CHANGE UP (ref 13–17)
HGB BLD-MCNC: 12.6 G/DL — LOW (ref 13–17)
HYALINE CASTS # UR AUTO: 0 /LPF — SIGNIFICANT CHANGE UP (ref 0–2)
IMM GRANULOCYTES NFR BLD AUTO: 0.2 % — SIGNIFICANT CHANGE UP (ref 0–1.5)
INR BLD: 1.52 RATIO — HIGH (ref 0.88–1.16)
KETONES UR-MCNC: NEGATIVE — SIGNIFICANT CHANGE UP
LACTATE BLDV-MCNC: 0.8 MMOL/L — SIGNIFICANT CHANGE UP (ref 0.7–2)
LEUKOCYTE ESTERASE UR-ACNC: NEGATIVE — SIGNIFICANT CHANGE UP
LIDOCAIN IGE QN: 22 U/L — SIGNIFICANT CHANGE UP (ref 7–60)
LYMPHOCYTES # BLD AUTO: 1.62 K/UL — SIGNIFICANT CHANGE UP (ref 1–3.3)
LYMPHOCYTES # BLD AUTO: 27.7 % — SIGNIFICANT CHANGE UP (ref 13–44)
MCHC RBC-ENTMCNC: 31.7 PG — SIGNIFICANT CHANGE UP (ref 27–34)
MCHC RBC-ENTMCNC: 32.1 GM/DL — SIGNIFICANT CHANGE UP (ref 32–36)
MCV RBC AUTO: 98.5 FL — SIGNIFICANT CHANGE UP (ref 80–100)
MONOCYTES # BLD AUTO: 0.44 K/UL — SIGNIFICANT CHANGE UP (ref 0–0.9)
MONOCYTES NFR BLD AUTO: 7.5 % — SIGNIFICANT CHANGE UP (ref 2–14)
NEUTROPHILS # BLD AUTO: 3.59 K/UL — SIGNIFICANT CHANGE UP (ref 1.8–7.4)
NEUTROPHILS NFR BLD AUTO: 61.4 % — SIGNIFICANT CHANGE UP (ref 43–77)
NITRITE UR-MCNC: NEGATIVE — SIGNIFICANT CHANGE UP
NRBC # BLD: 0 /100 WBCS — SIGNIFICANT CHANGE UP (ref 0–0)
PCO2 BLDV: 47 MMHG — SIGNIFICANT CHANGE UP (ref 35–50)
PH BLDV: 7.35 — SIGNIFICANT CHANGE UP (ref 7.35–7.45)
PH UR: 6 — SIGNIFICANT CHANGE UP (ref 5–8)
PLATELET # BLD AUTO: 144 K/UL — LOW (ref 150–400)
PO2 BLDV: 23 MMHG — LOW (ref 25–45)
POTASSIUM BLDV-SCNC: 4.1 MMOL/L — SIGNIFICANT CHANGE UP (ref 3.5–5.3)
POTASSIUM SERPL-MCNC: 4.3 MMOL/L — SIGNIFICANT CHANGE UP (ref 3.5–5.3)
POTASSIUM SERPL-SCNC: 4.3 MMOL/L — SIGNIFICANT CHANGE UP (ref 3.5–5.3)
PROT SERPL-MCNC: 7.3 G/DL — SIGNIFICANT CHANGE UP (ref 6–8.3)
PROT UR-MCNC: SIGNIFICANT CHANGE UP
PROTHROM AB SERPL-ACNC: 17.5 SEC — HIGH (ref 10–12.9)
RBC # BLD: 3.98 M/UL — LOW (ref 4.2–5.8)
RBC # FLD: 14.7 % — HIGH (ref 10.3–14.5)
RBC CASTS # UR COMP ASSIST: 2 /HPF — SIGNIFICANT CHANGE UP (ref 0–4)
SAO2 % BLDV: 34 % — LOW (ref 67–88)
SODIUM SERPL-SCNC: 140 MMOL/L — SIGNIFICANT CHANGE UP (ref 135–145)
SP GR SPEC: 1.02 — SIGNIFICANT CHANGE UP (ref 1.01–1.02)
UROBILINOGEN FLD QL: NEGATIVE — SIGNIFICANT CHANGE UP
WBC # BLD: 5.85 K/UL — SIGNIFICANT CHANGE UP (ref 3.8–10.5)
WBC # FLD AUTO: 5.85 K/UL — SIGNIFICANT CHANGE UP (ref 3.8–10.5)
WBC UR QL: 0 /HPF — SIGNIFICANT CHANGE UP (ref 0–5)

## 2020-01-21 PROCEDURE — 74177 CT ABD & PELVIS W/CONTRAST: CPT | Mod: 26

## 2020-01-21 PROCEDURE — 83690 ASSAY OF LIPASE: CPT

## 2020-01-21 PROCEDURE — 93010 ELECTROCARDIOGRAM REPORT: CPT

## 2020-01-21 PROCEDURE — 85027 COMPLETE CBC AUTOMATED: CPT

## 2020-01-21 PROCEDURE — 83605 ASSAY OF LACTIC ACID: CPT

## 2020-01-21 PROCEDURE — 82330 ASSAY OF CALCIUM: CPT

## 2020-01-21 PROCEDURE — 84132 ASSAY OF SERUM POTASSIUM: CPT

## 2020-01-21 PROCEDURE — 80053 COMPREHEN METABOLIC PANEL: CPT

## 2020-01-21 PROCEDURE — 82947 ASSAY GLUCOSE BLOOD QUANT: CPT

## 2020-01-21 PROCEDURE — 85730 THROMBOPLASTIN TIME PARTIAL: CPT

## 2020-01-21 PROCEDURE — 85610 PROTHROMBIN TIME: CPT

## 2020-01-21 PROCEDURE — 82435 ASSAY OF BLOOD CHLORIDE: CPT

## 2020-01-21 PROCEDURE — 87086 URINE CULTURE/COLONY COUNT: CPT

## 2020-01-21 PROCEDURE — 85014 HEMATOCRIT: CPT

## 2020-01-21 PROCEDURE — 74177 CT ABD & PELVIS W/CONTRAST: CPT

## 2020-01-21 PROCEDURE — 99284 EMERGENCY DEPT VISIT MOD MDM: CPT

## 2020-01-21 PROCEDURE — 82803 BLOOD GASES ANY COMBINATION: CPT

## 2020-01-21 PROCEDURE — 93005 ELECTROCARDIOGRAM TRACING: CPT

## 2020-01-21 PROCEDURE — 84295 ASSAY OF SERUM SODIUM: CPT

## 2020-01-21 PROCEDURE — 81001 URINALYSIS AUTO W/SCOPE: CPT

## 2020-01-21 NOTE — ED PROVIDER NOTE - PHYSICAL EXAMINATION
A&Ox3, NAD. NCAT. PERRL, EOMI. Neck supple, no LAD. Lungs CTAB. +S1S2, RRR, No m/r/g. Abd: large ventral hernia, soft, nontender and reducible abd otherwise soft, NT/ND, +BS, no rebound or guarding. Extremities: cap refill <2, pulses in distal extremities 4+, no edema. Skin without rash. CN II-XII intact. Strength 5/5 UE/LE. Sensations intact throughout. Gait steady.

## 2020-01-21 NOTE — ED PROVIDER NOTE - CARE PROVIDER_API CALL
Roque Mcnamara)  Surgery  [address] [facility], [place] [address] [place] [address]  Phone: [phone]  Fax: [phone]  Follow Up Time: 1-3 Days

## 2020-01-21 NOTE — ED ADULT NURSE NOTE - OBJECTIVE STATEMENT
89 yo male presents to ED from home c/o abdominal pain. Patient has known ventral hernia for several years. Patient reports intermittent pain at the hernia site, worse today, called surgeon, who sent him to ED. Patient denies SOB, CP, nvd, fever/chills, sick contacts, falls/loc. Patient A&OX3, breathing spontaneously, airway patent, bl clear lungs, abdomen nontender, +pulses, cap refill <2 seconds. Patient resting in bed, side rails up, plan of care explained.

## 2020-01-21 NOTE — ED PROVIDER NOTE - ATTENDING CONTRIBUTION TO CARE
Attending MD Youssef:   I personally have seen and examined this patient.  ACP, Resident, medical student note reviewed and agree on plan of care and except where noted.     89y M PMH CAD s/p CABG, stents, HTN, DM, CHF brought in by son for abdominal pain. Patient has had intermittent abdominal pain related to large ventral hernia, has been following up with Dr. Mcnamara, was planned for surgery but cardiac stent delayed procedure. Today patient had episode of pain lasting longer than usual, called Dr. Mcnamara's office and was directed to come to the emergency department. Hx recent urinary retention and UTI. Patient was QDoc'd, labs and CT reviewed.     On exam patient is well appearing, vitals wnl, rrr s1s2, lungs clear, abdomen with large ventral hernia, soft and reducible, no rebound or guarding, no lower ext edema, palpable pulses in all 4 ext.    CT reveals hernia without obstruction. Awaiting urine. Will discuss with surgery, likely dc home for surgery follow-up.

## 2020-01-21 NOTE — CONSULT NOTE ADULT - ASSESSMENT
89M multiple medical comorbidities and known reducible ventral hernia now p/w abd pain (now resolved) for several hours. CT demonstrated unchanged ventral wall hernia containing fat and non-obstructed bowel.  Hernia was readily reducible w/o tenderness. Patient was recommended for discharge and to call Dr. Mcnamara's office tomorrow (1/22/2020) to arrange followup.     - no acute surgical intervention at present time  - radiographic evidence of reducible fat and bowel containing ventral wall hernia, unchanged.  - no laboratory evidence of obstruction or bowel ischemia  - suggest patient be discharged home with outpatient followup by Dr. Mcnamara's office.    Plan discussed with attending surgeon NEDA Harrison MD.    Please contact [facility] (P: [phone]) with any questions.    LITA Eldridge MD, PGY-II  Surgery, [facility]  Pager: [phone]  [facility]

## 2020-01-21 NOTE — ED PROVIDER NOTE - NSFOLLOWUPINSTRUCTIONS_ED_ALL_ED_FT
- stay hydrated.  - take  tylenol 975mg every 6 hours as needed for pain  - follow up with your pcp in 1-2 days.  - follow up with Dr. Mcnamara this week, call number attached to make an appointment  - return if symptoms worsen, fever, worsening pain, nausea, vomiting, and all other concerns. - stay hydrated.  - take  tylenol 975mg every 6 hours as needed for pain  - follow up with your pcp in 1-2 days.  - follow up with Dr. Mcnamara this week, call number attached to make an appointment.  - return if symptoms worsen, fever, worsening pain, nausea, vomiting, and all other concerns.

## 2020-01-21 NOTE — ED PROVIDER NOTE - RAPID ASSESSMENT
89 yo male with PMHx of HTN, DM, CAD s/p stent, CHF, ventral hernia p/w abdominal pain.  Patient reports that he has had a ventral hernia for many years.  over the past few months, patient has been having intermittent pain at the hernia site.  Was being scheduled for surgery, but requiring cardiac stent which delayed procedure.  Abd pain started again this morning around 5 am.  Called his surgeon who told him to come to the ER.  Denies fevers/chills, NVD.  Normal appetite

## 2020-01-21 NOTE — ED PROVIDER NOTE - PATIENT PORTAL LINK FT
You can access the FollowMyHealth Patient Portal offered by [facility] by registering at the following website: [email or web address]. By joining [employer]’s FollowMyHealth portal, you will also be able to view your health information using other applications (apps) compatible with our system.

## 2020-01-21 NOTE — ED ADULT NURSE NOTE - NSIMPLEMENTINTERV_GEN_ALL_ED
Implemented All Fall Risk Interventions:  [place] to call system. Call bell, personal items and telephone within reach. Instruct patient to call for assistance. Room bathroom lighting operational. Non-slip footwear when patient is off stretcher. Physically safe environment: no spills, clutter or unnecessary equipment. Stretcher in lowest position, wheels locked, appropriate side rails in place. Provide visual cue, wrist band, yellow gown, etc. Monitor gait and stability. Monitor for mental status changes and reorient to person, place, and time. Review medications for side effects contributing to fall risk. Reinforce activity limits and safety measures with patient and family.

## 2020-01-21 NOTE — CONSULT NOTE ADULT - SUBJECTIVE AND OBJECTIVE BOX
CC: Patient is a 89y old  Male who presents with a chief complaint of abd pain.     HPI:  89M multiple medical comorbidities and known ventral wall hernia p/w 1d of abd pain that persisted longer than has in previous episodes so patient was brought to ED by son. Patient states the pain was lasting longer than previous episodes, called surgeon's office (Dr. Mcnamara) and was instructed to come to ED for workup.  IN ED, patient is hemodynamically stable w/o pain. Denies any nausea / vomiting and +flatus. +BM. Tolerated PO up through this AM and has appetite at present time. Denies any changes in bowel or bladder function.     PAST MEDICAL & SURGICAL HISTORY:  DVT (deep venous thrombosis)  Ventral hernia  Stented coronary artery  Hypertension  Diabetes  HTN (hypertension)  CAD (coronary artery disease) of artery bypass graft  H/O right knee surgery  S/P hemorrhoidectomy  S/P CABG (coronary artery bypass graft)    HOME MEDICATIONS:  Norvasc 5mg po qd  Plavix 75mg po qd  Synthroid 75mg po qd  Fenofibrate 160mg po qd  Coreg 37.5mg po bid  Eliquis 2.5mg po bid  Finasteride 5mg po qd  Lasix 20mg po every other day.   Niaspan ER 500mg po qhs  Wellbutrin 150mg po qhs  Tamsulosin 0.4mg po qhs  Bentyl 20mg po qhs    ALLERGIES:  ACE inhibitors (Other)  penicillin (Rash)  Lipitor (Muscle Pain)    FAMILY HISTORY:  Family history of leukemia (Sibling)    Objective:   Vital Signs Last 24 Hrs  T(C): 36.3 (21 Jan 2020 16:07), Max: 36.7 (21 Jan 2020 13:34)  T(F): 97.4 (21 Jan 2020 16:07), Max: 98.1 (21 Jan 2020 13:34)  HR: 60 (21 Jan 2020 16:07) (60 - 61)  BP: 145/67 (21 Jan 2020 16:07) (145/67 - 151/74)  RR: 18 (21 Jan 2020 16:07) (18 - 18)  SpO2: 100% (21 Jan 2020 16:07) (99% - 100%)    Physical Exam:  General: Well developed, well nourished, alert and cooperative, and appears to be in no acute distress.  HEENT: normocephalic, vision is grossly intact  Chest: respirations grossly unlabored on RA  Abdomen: soft, non-distended, non-tender, no guarding or rebound. a large ventral wall hernia is present and is easily reducible. rapidly reoccurs. non-tender at time of reduction.     LABS:                        12.6   5.85  )-----------( 144      ( 21 Jan 2020 15:23 )             39.2     01-21    140  |  106  |  36<H>  ----------------------------<  109<H>  4.3   |  21<L>  |  1.67<H>    Ca    9.1      21 Jan 2020 15:23    TPro  7.3  /  Alb  3.8  /  TBili  0.7  /  DBili  x   /  AST  23  /  ALT  12  /  AlkPhos  50  01-21    PT/INR - ( 21 Jan 2020 15:23 )   PT: 17.5 sec;   INR: 1.52 ratio    PTT - ( 21 Jan 2020 15:23 )  PTT:35.7 sec    LIVER FUNCTIONS - ( 21 Jan 2020 15:23 )  Alb: 3.8 g/dL / Pro: 7.3 g/dL / ALK PHOS: 50 U/L / ALT: 12 U/L / AST: 23 U/L / GGT: x           RADIOLOGY & ADDITIONAL STUDIES:    < from: CT Abdomen and Pelvis w/ IV Cont (01.21.20 @ 17:31) >  FINDINGS:    LOWER CHEST: Status post median sternotomy.    LIVER: Within normal limits.  BILE DUCTS: Normal caliber.  GALLBLADDER: Within normal limits.  SPLEEN: Within normal limits.  PANCREAS: Within normal limits.  ADRENALS: Within normal limits.  KIDNEYS/URETERS: Bilateral renal cortical and parapelvic cysts. A 2.1 cm nonobstructing left renal calculus. No hydronephrosis.     BLADDER: Within normal limits.  REPRODUCTIVE ORGANS: Prostate within normal limits.    BOWEL: No bowel obstruction.   PERITONEUM: No ascites.  VESSELS: Atherosclerotic changes.  RETROPERITONEUM/LYMPH NODES: No lymphadenopathy.    ABDOMINAL WALL: Moderate size midline supraumbilical hernia containing fat and nonobstructed small bowel. Fat-containing bilateral inguinal hernias.  BONES: Degenerative changes.    IMPRESSION:    Moderate size midline supraumbilical hernia containing fat and nonobstructed small bowel.    < end of copied text >

## 2020-01-21 NOTE — ED PROVIDER NOTE - OBJECTIVE STATEMENT
89 yo male with PMHx of HTN, DM, CAD s/p stent, CHF, ventral hernia p/w abdominal pain over his hernia site since 5am this morning, pain improved with laying down and worse with standing,  pt called his surgeon who told him to come to the ER. Patient reports that he has had a ventral hernia for many years however over the past few months, patient has been having intermittent pain at the hernia site.  Was being scheduled for surgery with Dr. Mcnamara, but requiring cardiac stent in July which delayed procedure.  Denies fevers/chills, NVD, constipation, shortness of breath, difficulty breathing, chest pain, decreased PO intake, no bloody stools. Last BM this morning which was normal in caliber for patient. Pt with improvement in pain since being in the ED.

## 2020-01-23 LAB
CULTURE RESULTS: NO GROWTH — SIGNIFICANT CHANGE UP
SPECIMEN SOURCE: SIGNIFICANT CHANGE UP

## 2020-02-08 ENCOUNTER — EMERGENCY (EMERGENCY)
Facility: HOSPITAL | Age: 85
LOS: 1 days | Discharge: HOME | End: 2020-02-08
Attending: STUDENT IN AN ORGANIZED HEALTH CARE EDUCATION/TRAINING PROGRAM
Payer: MEDICARE

## 2020-02-08 VITALS
HEART RATE: 74 BPM | OXYGEN SATURATION: 95 % | DIASTOLIC BLOOD PRESSURE: 65 MMHG | SYSTOLIC BLOOD PRESSURE: 157 MMHG | RESPIRATION RATE: 18 BRPM | TEMPERATURE: 98 F

## 2020-02-08 VITALS
OXYGEN SATURATION: 97 % | HEART RATE: 66 BPM | WEIGHT: 214.95 LBS | TEMPERATURE: 98 F | RESPIRATION RATE: 18 BRPM | SYSTOLIC BLOOD PRESSURE: 173 MMHG | DIASTOLIC BLOOD PRESSURE: 74 MMHG | HEIGHT: 66 IN

## 2020-02-08 DIAGNOSIS — Z98.890 OTHER SPECIFIED POSTPROCEDURAL STATES: Chronic | ICD-10-CM

## 2020-02-08 DIAGNOSIS — Z95.1 PRESENCE OF AORTOCORONARY BYPASS GRAFT: Chronic | ICD-10-CM

## 2020-02-08 LAB
ALBUMIN SERPL ELPH-MCNC: 4.1 G/DL — SIGNIFICANT CHANGE UP (ref 3.3–5)
ALP SERPL-CCNC: 51 U/L — SIGNIFICANT CHANGE UP (ref 40–120)
ALT FLD-CCNC: 12 U/L — SIGNIFICANT CHANGE UP (ref 10–45)
ANION GAP SERPL CALC-SCNC: 13 MMOL/L — SIGNIFICANT CHANGE UP (ref 5–17)
AST SERPL-CCNC: 18 U/L — SIGNIFICANT CHANGE UP (ref 10–40)
BASOPHILS # BLD AUTO: 0.02 K/UL — SIGNIFICANT CHANGE UP (ref 0–0.2)
BASOPHILS NFR BLD AUTO: 0.4 % — SIGNIFICANT CHANGE UP (ref 0–2)
BILIRUB SERPL-MCNC: 0.6 MG/DL — SIGNIFICANT CHANGE UP (ref 0.2–1.2)
BUN SERPL-MCNC: 31 MG/DL — HIGH (ref 7–23)
CALCIUM SERPL-MCNC: 9.5 MG/DL — SIGNIFICANT CHANGE UP (ref 8.4–10.5)
CHLORIDE SERPL-SCNC: 105 MMOL/L — SIGNIFICANT CHANGE UP (ref 96–108)
CO2 SERPL-SCNC: 23 MMOL/L — SIGNIFICANT CHANGE UP (ref 22–31)
CREAT SERPL-MCNC: 1.62 MG/DL — HIGH (ref 0.5–1.3)
EOSINOPHIL # BLD AUTO: 0.12 K/UL — SIGNIFICANT CHANGE UP (ref 0–0.5)
EOSINOPHIL NFR BLD AUTO: 2.2 % — SIGNIFICANT CHANGE UP (ref 0–6)
GAS PNL BLDV: SIGNIFICANT CHANGE UP
GLUCOSE SERPL-MCNC: 111 MG/DL — HIGH (ref 70–99)
HCT VFR BLD CALC: 42.1 % — SIGNIFICANT CHANGE UP (ref 39–50)
HGB BLD-MCNC: 13 G/DL — SIGNIFICANT CHANGE UP (ref 13–17)
IMM GRANULOCYTES NFR BLD AUTO: 0.4 % — SIGNIFICANT CHANGE UP (ref 0–1.5)
LYMPHOCYTES # BLD AUTO: 1.37 K/UL — SIGNIFICANT CHANGE UP (ref 1–3.3)
LYMPHOCYTES # BLD AUTO: 24.6 % — SIGNIFICANT CHANGE UP (ref 13–44)
MCHC RBC-ENTMCNC: 30.7 PG — SIGNIFICANT CHANGE UP (ref 27–34)
MCHC RBC-ENTMCNC: 30.9 GM/DL — LOW (ref 32–36)
MCV RBC AUTO: 99.3 FL — SIGNIFICANT CHANGE UP (ref 80–100)
MONOCYTES # BLD AUTO: 0.55 K/UL — SIGNIFICANT CHANGE UP (ref 0–0.9)
MONOCYTES NFR BLD AUTO: 9.9 % — SIGNIFICANT CHANGE UP (ref 2–14)
NEUTROPHILS # BLD AUTO: 3.49 K/UL — SIGNIFICANT CHANGE UP (ref 1.8–7.4)
NEUTROPHILS NFR BLD AUTO: 62.5 % — SIGNIFICANT CHANGE UP (ref 43–77)
NRBC # BLD: 0 /100 WBCS — SIGNIFICANT CHANGE UP (ref 0–0)
PLATELET # BLD AUTO: 183 K/UL — SIGNIFICANT CHANGE UP (ref 150–400)
POTASSIUM SERPL-MCNC: 4.4 MMOL/L — SIGNIFICANT CHANGE UP (ref 3.5–5.3)
POTASSIUM SERPL-SCNC: 4.4 MMOL/L — SIGNIFICANT CHANGE UP (ref 3.5–5.3)
PROT SERPL-MCNC: 7.9 G/DL — SIGNIFICANT CHANGE UP (ref 6–8.3)
RBC # BLD: 4.24 M/UL — SIGNIFICANT CHANGE UP (ref 4.2–5.8)
RBC # FLD: 15 % — HIGH (ref 10.3–14.5)
SODIUM SERPL-SCNC: 141 MMOL/L — SIGNIFICANT CHANGE UP (ref 135–145)
WBC # BLD: 5.57 K/UL — SIGNIFICANT CHANGE UP (ref 3.8–10.5)
WBC # FLD AUTO: 5.57 K/UL — SIGNIFICANT CHANGE UP (ref 3.8–10.5)

## 2020-02-08 PROCEDURE — 83605 ASSAY OF LACTIC ACID: CPT

## 2020-02-08 PROCEDURE — 84132 ASSAY OF SERUM POTASSIUM: CPT

## 2020-02-08 PROCEDURE — 82330 ASSAY OF CALCIUM: CPT

## 2020-02-08 PROCEDURE — 85027 COMPLETE CBC AUTOMATED: CPT

## 2020-02-08 PROCEDURE — 82803 BLOOD GASES ANY COMBINATION: CPT

## 2020-02-08 PROCEDURE — 80053 COMPREHEN METABOLIC PANEL: CPT

## 2020-02-08 PROCEDURE — 82947 ASSAY GLUCOSE BLOOD QUANT: CPT

## 2020-02-08 PROCEDURE — 84295 ASSAY OF SERUM SODIUM: CPT

## 2020-02-08 PROCEDURE — 85014 HEMATOCRIT: CPT

## 2020-02-08 PROCEDURE — 99284 EMERGENCY DEPT VISIT MOD MDM: CPT | Mod: GC

## 2020-02-08 PROCEDURE — 99283 EMERGENCY DEPT VISIT LOW MDM: CPT

## 2020-02-08 PROCEDURE — 82435 ASSAY OF BLOOD CHLORIDE: CPT

## 2020-02-08 RX ORDER — ACETAMINOPHEN 500 MG
650 TABLET ORAL ONCE
Refills: 0 | Status: COMPLETED | OUTPATIENT
Start: 2020-02-08 | End: 2020-02-08

## 2020-02-08 RX ADMIN — Medication 650 MILLIGRAM(S): at 14:47

## 2020-02-08 NOTE — ED PROVIDER NOTE - PHYSICAL EXAMINATION
Gen: NAD, AOx3  Head: NCAT  HEENT: PERRL, oral mucosa moist, normal conjunctiva, oropharynx clear without exudate or erythema  Lung: CTAB, no respiratory distress, no wheezing, rales, rhonchi  CV: normal s1/s2, rrr, no murmurs, Normal perfusion, pulses 2+ throughout  Abd: soft, ventral hernia soft, easily reducible, no overlying skin changes, non-tender  MSK: No edema, no visible deformities, full range of motion in all 4 extremities  Neuro: CN II-XII grossly intact, No focal neurologic deficits  Skin: No rash   Psych: normal affect

## 2020-02-08 NOTE — ED PROVIDER NOTE - CARE PROVIDER_API CALL
Chalino Arizmendi)  Cardiology; Internal Medicine; Interventional Cardiology  [facility] Dept of Cardiology, [address] [place] [address]  Phone: [phone]  Fax: [phone]  Follow Up Time: Chalino Arizmendi)  Cardiology; Internal Medicine; Interventional Cardiology  [facility] Dept of Cardiology, [address] [place] [address]  Phone: [phone]  Fax: [phone]  Follow Up Time:     Roque Mcnamara)  Surgery  [address] [place] [address], [place] Office [address] [place] [address]  Phone: [phone]  Fax: [phone]  Follow Up Time:

## 2020-02-08 NOTE — ED PROVIDER NOTE - NSFOLLOWUPINSTRUCTIONS_ED_ALL_ED_FT
Follow up with Dr Mcnamara and your cardiologists at the next available appointment.  Take tylenol 650mg every 4 hours as needed for pain.  Use the abdominal binder as directed.   Return to the ED if you have severe pain, vomiting, difficulty passing gas, or if any other concerning symptoms arise.

## 2020-02-08 NOTE — ED PROVIDER NOTE - OBJECTIVE STATEMENT
89M with history of reducible hernia, CAD s/p stent in July, who presents with abdominal pain around the site of the hernia which is intermittent and occurs more frequently over past 3 weeks. Denies nausea, vomiting, obstipation, diarrhea. Pt plans to have elective repair but has not been able to given recent cardiac stent.

## 2020-02-08 NOTE — CONSULT NOTE ADULT - ASSESSMENT
ASSESSMENT: Patient is a 89y old m with ventral hernia w/o incarceration or strangulation awaiting hernia repair now with more frequent abdominal pain.    PLAN:    - no concern for hernia incarceration or bowel obstruction  - we counselled patient that hernia is large with lower risk of herniation but that he should have surgery as soon as he can to relieve his symptoms  - we spoke with family extensively regarding which cardiologist placed the stent and how to obtain cardiac clearance, as well as risks and benefits, in order to proceed with his hernia repair. The cardiologist who placed the stent (Dr. Ugo Allred) said 6 months til surgery while his cardiologist in the city at an outside system said to wait one year which would be this July. We gave him contact info for the cardiologists to speak, hopefully prior to seeing Dr. Mcnamara in the office.  - no CT indicated, d/c patient home w/ follow-up with Dr. Mcnamara  - Patient discussed with Attending, Dr. Harrison    Surgery Consult Resident [phone]

## 2020-02-08 NOTE — CONSULT NOTE ADULT - SUBJECTIVE AND OBJECTIVE BOX
GENERAL SURGERY CONSULT NOTE  --------------------------------------------------------------------------------------------    HPI:   Patient is a 89y old  Male who presents with a chief complaint of recurring ventral hernia with worsening pain    HPI:    89M with history of reducible hernia, CAD s/p stent in July (Plavix) and DVT (Eliquis), who presents with abdominal pain around the site of the hernia which is intermittent and occurs more frequently over past 3 weeks. He has had 4 episodes of herniation over past 3 days with prolonged pain this morning, slightly improved with Tylenol. Denies nausea, vomiting, obstipation, diarrhea but admits he is concerned for incarceration every time it comes out. Pt plans to have elective repair but has not been able to given recent cardiac stent and discrepancy between cardiologists of how long to wait before having surgery.    In ED, hernia is reducible, patient is affable, and family is agreeable to leaving with future discussions with cardiologists in hopes of having surgery sooner. They have an appointment this week with Dr. Mcnamara.    Patient denies fevers/chills, denies lightheadedness/dizziness, denies SOB/chest pain, denies nausea/vomiting, denies constipation/diarrhea.     ROS: 10-system review is otherwise negative except HPI above.      PAST MEDICAL & SURGICAL HISTORY:  DVT (deep venous thrombosis)  Ventral hernia  Stented coronary artery  Hypertension  Diabetes  HTN (hypertension)  CAD (coronary artery disease) of artery bypass graft  H/O right knee surgery  S/P hemorrhoidectomy  S/P CABG (coronary artery bypass graft)    FAMILY HISTORY:  Family history of leukemia (Sibling)  [x] Family history not pertinent as reviewed with the patient and family    ALLERGIES: ACE inhibitors (Other)  Lipitor (Muscle Pain)  penicillin (Rash)    CURRENT MEDICATIONS  MEDICATIONS (STANDING):   MEDICATIONS (PRN):  --------------------------------------------------------------------------------------------    Vitals:   T(C): 36.7 (02-08-20 @ 13:21), Max: 36.7 (02-08-20 @ 13:21)  HR: 66 (02-08-20 @ 13:21) (66 - 66)  BP: 173/74 (02-08-20 @ 13:21) (173/74 - 173/74)  RR: 18 (02-08-20 @ 13:21) (18 - 18)  SpO2: 97% (02-08-20 @ 13:21) (97% - 97%)  CAPILLARY BLOOD GLUCOSE          Height (cm): 167.64 (02-08 @ 13:21)  Weight (kg): 97.5 (02-08 @ 13:21)  BMI (kg/m2): 34.7 (02-08 @ 13:21)  BSA (m2): 2.06 (02-08 @ 13:21)    PHYSICAL EXAM:   General: NAD, Lying in bed comfortably  Neuro: A+Ox3  HEENT: NC/AT, EOMI  Neck: Soft, supple  Cardio: RRR, nml S1/S2  Resp: Good effort, CTA b/l  Thorax: No chest wall tenderness  Breast: No lesions/masses, no drainage  GI/Abd: Soft, NT/ND, large ventral hernia (7-8 cm diam), soft, easily reducible, no color changes  Vascular: All 4 extremities warm.  Skin: Intact, no breakdown  Lymphatic/Nodes: No palpable lymphadenopathy  Musculoskeletal: All 4 extremities moving spontaneously, no limitations    --------------------------------------------------------------------------------------------    LABS  CBC (02-08 @ 15:16)                              13.0                           5.57    )----------------(  183        62.5  % Neutrophils, 24.6  % Lymphocytes, ANC: 3.49                                42.1      BMP (02-08 @ 15:16)             141     |  105     |  31<H> 		Ca++ --      Ca 9.5                ---------------------------------( 111<H>		Mg --                 4.4     |  23      |  1.62<H>			Ph --        LFTs (02-08 @ 15:16)      TPro 7.9 / Alb 4.1 / TBili 0.6 / DBili -- / AST 18 / ALT 12 / AlkPhos 51

## 2020-02-08 NOTE — ED PROVIDER NOTE - CLINICAL SUMMARY MEDICAL DECISION MAKING FREE TEXT BOX
89M with ventral hernia p/w abdominal pain over hernia; no signs of incarceration or strangulation; will give pain control; surgery consult; dc with outpt follow up

## 2020-02-08 NOTE — ED PROVIDER NOTE - CARE PROVIDERS DIRECT ADDRESSES
,[email or web address]

## 2020-02-08 NOTE — ED PROVIDER NOTE - PATIENT PORTAL LINK FT
You can access the FollowMyHealth Patient Portal offered by [facility] by registering at the following website: [email or web address]. By joining [employer]’s FollowMyHealth portal, you will also be able to view your health information using other applications (apps) compatible with our system.

## 2020-02-08 NOTE — ED PROVIDER NOTE - ATTENDING CONTRIBUTION TO CARE
89 M nontoxic appearing pt w/ a reproducible abdominal wall hernia, no vomiting, passing gas, accomapnied w/ family, and reports no fevers no chills, will have follow up with surgery . given recent stent placement still awaiting cardiology clearance.   hernia reproduced by me and the resident w/out difficulty.

## 2020-02-08 NOTE — ED ADULT NURSE NOTE - OBJECTIVE STATEMENT
89 yo male A&OX3 presents to the ED with the c/o abd pain due to hernia. Pt states that he has had a hernia for many years and states that it has been intermittently bulging x 2 weeks. As per pt son, pt was seen in hospital 2 weeks ago and since he has been home his hernia "popped out" 5x. Pt denies any n/v/d, + flatulence and LBM was this morning, normal in appearance. Pt states that pain is intermittent. Denies fevers or chills, abd round, LLQ distended and has a firm round bulge. Pt was recently seen by his PMD who states that he is unable to get abd surgery due to recent stent placement and AC usage. MAEX4, family at bedside

## 2020-02-21 ENCOUNTER — EMERGENCY (EMERGENCY)
Facility: HOSPITAL | Age: 85
LOS: 1 days | Discharge: HOME | End: 2020-02-21
Attending: EMERGENCY MEDICINE
Payer: MEDICARE

## 2020-02-21 VITALS
HEART RATE: 63 BPM | RESPIRATION RATE: 18 BRPM | SYSTOLIC BLOOD PRESSURE: 163 MMHG | DIASTOLIC BLOOD PRESSURE: 80 MMHG | TEMPERATURE: 98 F | OXYGEN SATURATION: 96 %

## 2020-02-21 VITALS
RESPIRATION RATE: 18 BRPM | OXYGEN SATURATION: 98 % | HEART RATE: 68 BPM | HEIGHT: 66 IN | WEIGHT: 214.95 LBS | TEMPERATURE: 98 F | SYSTOLIC BLOOD PRESSURE: 128 MMHG | DIASTOLIC BLOOD PRESSURE: 75 MMHG

## 2020-02-21 DIAGNOSIS — Z98.890 OTHER SPECIFIED POSTPROCEDURAL STATES: Chronic | ICD-10-CM

## 2020-02-21 DIAGNOSIS — Z95.1 PRESENCE OF AORTOCORONARY BYPASS GRAFT: Chronic | ICD-10-CM

## 2020-02-21 LAB
ALBUMIN SERPL ELPH-MCNC: 4 G/DL — SIGNIFICANT CHANGE UP (ref 3.3–5)
ALP SERPL-CCNC: 49 U/L — SIGNIFICANT CHANGE UP (ref 40–120)
ALT FLD-CCNC: 11 U/L — SIGNIFICANT CHANGE UP (ref 10–45)
ANION GAP SERPL CALC-SCNC: 11 MMOL/L — SIGNIFICANT CHANGE UP (ref 5–17)
APPEARANCE UR: CLEAR — SIGNIFICANT CHANGE UP
APTT BLD: 35.8 SEC — SIGNIFICANT CHANGE UP (ref 27.5–36.3)
AST SERPL-CCNC: 22 U/L — SIGNIFICANT CHANGE UP (ref 10–40)
BACTERIA # UR AUTO: NEGATIVE — SIGNIFICANT CHANGE UP
BASE EXCESS BLDV CALC-SCNC: 2 MMOL/L — SIGNIFICANT CHANGE UP (ref -2–2)
BASOPHILS # BLD AUTO: 0.02 K/UL — SIGNIFICANT CHANGE UP (ref 0–0.2)
BASOPHILS NFR BLD AUTO: 0.3 % — SIGNIFICANT CHANGE UP (ref 0–2)
BILIRUB SERPL-MCNC: 0.8 MG/DL — SIGNIFICANT CHANGE UP (ref 0.2–1.2)
BILIRUB UR-MCNC: NEGATIVE — SIGNIFICANT CHANGE UP
BUN SERPL-MCNC: 29 MG/DL — HIGH (ref 7–23)
CA-I SERPL-SCNC: 1.19 MMOL/L — SIGNIFICANT CHANGE UP (ref 1.12–1.3)
CALCIUM SERPL-MCNC: 9.4 MG/DL — SIGNIFICANT CHANGE UP (ref 8.4–10.5)
CHLORIDE BLDV-SCNC: 111 MMOL/L — HIGH (ref 96–108)
CHLORIDE SERPL-SCNC: 105 MMOL/L — SIGNIFICANT CHANGE UP (ref 96–108)
CO2 BLDV-SCNC: 29 MMOL/L — SIGNIFICANT CHANGE UP (ref 22–30)
CO2 SERPL-SCNC: 23 MMOL/L — SIGNIFICANT CHANGE UP (ref 22–31)
COLOR SPEC: SIGNIFICANT CHANGE UP
CREAT SERPL-MCNC: 1.66 MG/DL — HIGH (ref 0.5–1.3)
DIFF PNL FLD: NEGATIVE — SIGNIFICANT CHANGE UP
EOSINOPHIL # BLD AUTO: 0.17 K/UL — SIGNIFICANT CHANGE UP (ref 0–0.5)
EOSINOPHIL NFR BLD AUTO: 2.6 % — SIGNIFICANT CHANGE UP (ref 0–6)
EPI CELLS # UR: 1 /HPF — SIGNIFICANT CHANGE UP
GAS PNL BLDV: 139 MMOL/L — SIGNIFICANT CHANGE UP (ref 135–145)
GAS PNL BLDV: SIGNIFICANT CHANGE UP
GAS PNL BLDV: SIGNIFICANT CHANGE UP
GLUCOSE BLDV-MCNC: 102 MG/DL — HIGH (ref 70–99)
GLUCOSE SERPL-MCNC: 105 MG/DL — HIGH (ref 70–99)
GLUCOSE UR QL: NEGATIVE — SIGNIFICANT CHANGE UP
HCO3 BLDV-SCNC: 28 MMOL/L — SIGNIFICANT CHANGE UP (ref 21–29)
HCT VFR BLD CALC: 42.2 % — SIGNIFICANT CHANGE UP (ref 39–50)
HCT VFR BLDA CALC: 43 % — SIGNIFICANT CHANGE UP (ref 39–50)
HGB BLD CALC-MCNC: 14 G/DL — SIGNIFICANT CHANGE UP (ref 13–17)
HGB BLD-MCNC: 13.3 G/DL — SIGNIFICANT CHANGE UP (ref 13–17)
HYALINE CASTS # UR AUTO: 0 /LPF — SIGNIFICANT CHANGE UP (ref 0–2)
IMM GRANULOCYTES NFR BLD AUTO: 0.2 % — SIGNIFICANT CHANGE UP (ref 0–1.5)
INR BLD: 1.49 RATIO — HIGH (ref 0.88–1.16)
KETONES UR-MCNC: NEGATIVE — SIGNIFICANT CHANGE UP
LACTATE BLDV-MCNC: 1.3 MMOL/L — SIGNIFICANT CHANGE UP (ref 0.7–2)
LEUKOCYTE ESTERASE UR-ACNC: NEGATIVE — SIGNIFICANT CHANGE UP
LIDOCAIN IGE QN: 25 U/L — SIGNIFICANT CHANGE UP (ref 7–60)
LYMPHOCYTES # BLD AUTO: 1.58 K/UL — SIGNIFICANT CHANGE UP (ref 1–3.3)
LYMPHOCYTES # BLD AUTO: 24.2 % — SIGNIFICANT CHANGE UP (ref 13–44)
MCHC RBC-ENTMCNC: 31.3 PG — SIGNIFICANT CHANGE UP (ref 27–34)
MCHC RBC-ENTMCNC: 31.5 GM/DL — LOW (ref 32–36)
MCV RBC AUTO: 99.3 FL — SIGNIFICANT CHANGE UP (ref 80–100)
MONOCYTES # BLD AUTO: 0.49 K/UL — SIGNIFICANT CHANGE UP (ref 0–0.9)
MONOCYTES NFR BLD AUTO: 7.5 % — SIGNIFICANT CHANGE UP (ref 2–14)
NEUTROPHILS # BLD AUTO: 4.27 K/UL — SIGNIFICANT CHANGE UP (ref 1.8–7.4)
NEUTROPHILS NFR BLD AUTO: 65.2 % — SIGNIFICANT CHANGE UP (ref 43–77)
NITRITE UR-MCNC: NEGATIVE — SIGNIFICANT CHANGE UP
NRBC # BLD: 0 /100 WBCS — SIGNIFICANT CHANGE UP (ref 0–0)
PCO2 BLDV: 49 MMHG — SIGNIFICANT CHANGE UP (ref 35–50)
PH BLDV: 7.37 — SIGNIFICANT CHANGE UP (ref 7.35–7.45)
PH UR: 6 — SIGNIFICANT CHANGE UP (ref 5–8)
PLATELET # BLD AUTO: 194 K/UL — SIGNIFICANT CHANGE UP (ref 150–400)
PO2 BLDV: 23 MMHG — LOW (ref 25–45)
POTASSIUM BLDV-SCNC: 4 MMOL/L — SIGNIFICANT CHANGE UP (ref 3.5–5.3)
POTASSIUM SERPL-MCNC: 4.2 MMOL/L — SIGNIFICANT CHANGE UP (ref 3.5–5.3)
POTASSIUM SERPL-SCNC: 4.2 MMOL/L — SIGNIFICANT CHANGE UP (ref 3.5–5.3)
PROT SERPL-MCNC: 7.9 G/DL — SIGNIFICANT CHANGE UP (ref 6–8.3)
PROT UR-MCNC: NEGATIVE — SIGNIFICANT CHANGE UP
PROTHROM AB SERPL-ACNC: 17.2 SEC — HIGH (ref 10–12.9)
RBC # BLD: 4.25 M/UL — SIGNIFICANT CHANGE UP (ref 4.2–5.8)
RBC # FLD: 14.5 % — SIGNIFICANT CHANGE UP (ref 10.3–14.5)
RBC CASTS # UR COMP ASSIST: 4 /HPF — SIGNIFICANT CHANGE UP (ref 0–4)
SAO2 % BLDV: 33 % — LOW (ref 67–88)
SODIUM SERPL-SCNC: 139 MMOL/L — SIGNIFICANT CHANGE UP (ref 135–145)
SP GR SPEC: 1.01 — SIGNIFICANT CHANGE UP (ref 1.01–1.02)
UROBILINOGEN FLD QL: NEGATIVE — SIGNIFICANT CHANGE UP
WBC # BLD: 6.54 K/UL — SIGNIFICANT CHANGE UP (ref 3.8–10.5)
WBC # FLD AUTO: 6.54 K/UL — SIGNIFICANT CHANGE UP (ref 3.8–10.5)
WBC UR QL: 0 /HPF — SIGNIFICANT CHANGE UP (ref 0–5)

## 2020-02-21 PROCEDURE — 80053 COMPREHEN METABOLIC PANEL: CPT

## 2020-02-21 PROCEDURE — 82435 ASSAY OF BLOOD CHLORIDE: CPT

## 2020-02-21 PROCEDURE — 82803 BLOOD GASES ANY COMBINATION: CPT

## 2020-02-21 PROCEDURE — 93005 ELECTROCARDIOGRAM TRACING: CPT

## 2020-02-21 PROCEDURE — 84295 ASSAY OF SERUM SODIUM: CPT

## 2020-02-21 PROCEDURE — 99284 EMERGENCY DEPT VISIT MOD MDM: CPT | Mod: 25

## 2020-02-21 PROCEDURE — 71045 X-RAY EXAM CHEST 1 VIEW: CPT

## 2020-02-21 PROCEDURE — 82947 ASSAY GLUCOSE BLOOD QUANT: CPT

## 2020-02-21 PROCEDURE — 85610 PROTHROMBIN TIME: CPT

## 2020-02-21 PROCEDURE — 83690 ASSAY OF LIPASE: CPT

## 2020-02-21 PROCEDURE — 85730 THROMBOPLASTIN TIME PARTIAL: CPT

## 2020-02-21 PROCEDURE — 83605 ASSAY OF LACTIC ACID: CPT

## 2020-02-21 PROCEDURE — 99284 EMERGENCY DEPT VISIT MOD MDM: CPT | Mod: GC

## 2020-02-21 PROCEDURE — 82330 ASSAY OF CALCIUM: CPT

## 2020-02-21 PROCEDURE — 71045 X-RAY EXAM CHEST 1 VIEW: CPT | Mod: 26

## 2020-02-21 PROCEDURE — 81001 URINALYSIS AUTO W/SCOPE: CPT

## 2020-02-21 PROCEDURE — 84132 ASSAY OF SERUM POTASSIUM: CPT

## 2020-02-21 PROCEDURE — 85014 HEMATOCRIT: CPT

## 2020-02-21 PROCEDURE — 85027 COMPLETE CBC AUTOMATED: CPT

## 2020-02-21 NOTE — ED PROVIDER NOTE - NSFOLLOWUPINSTRUCTIONS_ED_ALL_ED_FT
1) Please follow-up with Dr. Mcnamara on Tuesday 2/25.   If you cannot follow-up with your doctor(s), please return to the ED for any urgent issues.  2) If you have any worsening of symptoms or any other concerns please return to the ED immediately.  3) Please continue taking your home medications as directed.  4) You may have been given a copy of your labs and/or imaging.  Please go over these with your primary care doctor.   5)  You can take Tylenol 650 mg (also called acetaminophen) every 6 hours.   Don't use more than 3500mg of Tylenol in any 24-hour period. Make sure your other prescription/over-the-counter medications don't contain any Tylenol so you don't take too much.

## 2020-02-21 NOTE — ED ADULT NURSE NOTE - NSIMPLEMENTINTERV_GEN_ALL_ED
Implemented All Fall with Harm Risk Interventions:  [place] to call system. Call bell, personal items and telephone within reach. Instruct patient to call for assistance. Room bathroom lighting operational. Non-slip footwear when patient is off stretcher. Physically safe environment: no spills, clutter or unnecessary equipment. Stretcher in lowest position, wheels locked, appropriate side rails in place. Provide visual cue, wrist band, yellow gown, etc. Monitor gait and stability. Monitor for mental status changes and reorient to person, place, and time. Review medications for side effects contributing to fall risk. Reinforce activity limits and safety measures with patient and family. Provide visual clues: red socks.

## 2020-02-21 NOTE — ED PROVIDER NOTE - CLINICAL SUMMARY MEDICAL DECISION MAKING FREE TEXT BOX
89y/o M w/ h/o ventral abdominal hernia for 20 years, CABG x3, CAD s/p stent 7/11/19 p/w intermittent abdominal pain around hernia site with no signs of incarceration or strangulation. Will get EKG, labs, and education about medical problems and likely d/c home. 89y/o M w/ h/o ventral abdominal hernia for 20 years, CABG x3, CAD s/p stent 7/11/19 p/w intermittent abdominal pain around hernia site with no signs of incarceration or strangulation. Will get EKG, labs, and education about medical problems and likely d/c home.    MARIZA Zhao MD: Pt is a 89 y/o male w/ PMH of ventral abdominal hernia x 20 years, CABG x3, CAD s/p stent 7/11/19 who presents for abdominal pain at his hernia site today. Per patient, he has frequent episodes of pain in area of his hernia when he stands up, which resolves with lying flat and tylenol. Pt and son are frustrated as pt wishes to have an elective repair, however, has not been able to get clearance yet from cardiology. Pt saw his outpt Cardiologist who advised him to see Dr. Arizmendi from [facility] cardiology for clearance, as he is the one who placed the stent. They also told patient that he will likely require nuclear stress test prior to clearance. Pt's son called Dr. Arizmendi's office who advised them to see Dr. Mcnamara (surgeon) first. Pt has appt on Tuesday with Dr. Mcnamara. Pt denies: chest pain, SOB, cough, fevers, chills, pleuritic chest pain, current abdominal pain, n/v/d, back pain, neck pain, HA, neck stiffness, focal numbness or weakness, visual changes, dizziness, lightheadedness, leg pain/swelling, recent travel, recent trauma, recent immobilization, dysuria, hematuria, rash. No black or bloody stools. Exam: A & O x 3, NAD, HEENT WNL and no facial asymmetry; lungs CTAB, cor +S1/S2, RRR, no murmurs; abdomen +abdominal wall large ventral hernia,  soft NTND; extremities with no edema; skin with no rashes, neuro exam non focal with no motor or sensory deficits.  Pt without abd pain or abd ttp on exam or n/v/d or bloody stools to suggest bowel ischemia. Labs obtained with lactate to r/o evidence of ischemia. If no acute abnormality, no indication for admission at this time. Placed patient's name in follow-up book so that  can assist patient to make appt. with Dr. Arizmendi to inquire about cardiac clearance for this elective surgery. Discussed with patient and his son return hernia precautions in detail. All questions answered.

## 2020-02-21 NOTE — ED PROVIDER NOTE - OBJECTIVE STATEMENT
89y/o M w/ h/o ventral abdominal hernia for 20 years, CABG x3, CAD s/p stent 7/11/19 p/w abdominal pain hernia site, intermittent, improves with Tylenol and worse with position changes. Pt plans to have elective repair but has not been able to given since recent cardiac stent and has an o/p appt with Dr. Mcnamara on Tuesaday. Denies fevers, chills, nausea, vomiting, obstipation, diarrhea. Currently asymptomatic now.

## 2020-02-21 NOTE — ED ADULT NURSE NOTE - OBJECTIVE STATEMENT
Pt 89 y/o male AxO3 presents to ED complaining of abdominal hernia pain worsening this am. Pt endorses abdominal hernia for over 10 years, gradually getting larger. Pt reports intermittent pain, last took tylenol 12noon today. PMH of CAD with Stents 6 months ago, therefore cardiologist will not clear pt for surgery as per son at bedside. Upon assessment, abdomen soft and nontender with large abdominal hernia to upper right umbilical region, +strong peripheral pulses, moving all extremities without difficulty, lungs clear. Pt denies pain at rest, but endorses 9/10 pain with movement. Pt is well appearing, speaking full sentences without difficulty. Breathing spontaneous and unlabored. Pt denies CP, SOB, HA, vision changes, n/v/d, fevers chills, abdominal pain. Safety and comfort measures initiated- bed placed in lowest position and side rails raised. Pt oriented to call bell system.

## 2020-02-21 NOTE — ED PROVIDER NOTE - NS ED ROS FT
GENERAL: denies fever, chills  HEENT: denies headaches, lightheadedness, dizziness  CARDIAC: denies chest pain, palpitations  PULM: denies SOB, cough  GI: denies abdominal pain, nausea, vomiting, diarrhea, constipation, melena, hematochezia  : denies dysuria, frequency, incontinence, hematuria  NEURO: denies motor weakness, sensory changes  MSK: denies joint, muscle pain  SKIN: denies new rashes, hives  HEME: denies active bleeding, bruising

## 2020-02-21 NOTE — ED PROVIDER NOTE - PATIENT PORTAL LINK FT
You can access the FollowMyHealth Patient Portal offered by [facility] by registering at the following website: [email or web address]. By joining [email or web address]’s FollowMyHealth portal, you will also be able to view your health information using other applications (apps) compatible with our system.

## 2020-02-25 ENCOUNTER — APPOINTMENT (OUTPATIENT)
Dept: SURGERY | Facility: CLINIC | Age: 85
End: 2020-02-25
Payer: MEDICARE

## 2020-02-25 PROCEDURE — 99215 OFFICE O/P EST HI 40 MIN: CPT

## 2020-02-28 NOTE — PHYSICAL EXAM
[Respiratory Effort] : normal respiratory effort [Abdominal Aorta] : Normal abdominal aorta [Normal Rate and Rhythm] : normal rate and rhythm [Abdominal Masses] : Abdominal mass present [Abdomen Tenderness] : ~T ~M No abdominal tenderness [No HSM] : no hepatosplenomegaly [Alert] : alert [Skin Ulcer] : ulcer [Oriented to Person] : oriented to person [Oriented to Place] : oriented to place [Oriented to Time] : oriented to time [de-identified] : WD/WN man in NAD [Calm] : calm [de-identified] : Reducible midline hernia  [de-identified] : NCAT no scleral icterus [de-identified] : Walks with aid

## 2020-02-28 NOTE — HISTORY OF PRESENT ILLNESS
[de-identified] : The patient presents with a large ventral hernia. The patient has recently presented to the [facility] ED with symptoms consistent with incarceration. In these instances the symptoms resolved and due to his advanced age and history of congestive heart failure he was deemed not a candidate for urgent repair. The instances have recurred however over the last 8 months. In addition his CHF led to a workup and placement of cardiac stents. He also has had  DVT and is on Plavix and Elquis.

## 2020-02-28 NOTE — REVIEW OF SYSTEMS
[Recent Weight Loss (___ Lbs)] : recent [unfilled] ~Ulb weight loss [As Noted in HPI] : as noted in HPI [Sleep Disturbances] : sleep disturbances [Negative] : Heme/Lymph

## 2020-03-01 ENCOUNTER — EMERGENCY (EMERGENCY)
Facility: HOSPITAL | Age: 85
LOS: 1 days | Discharge: HOME | End: 2020-03-01
Attending: EMERGENCY MEDICINE
Payer: MEDICARE

## 2020-03-01 VITALS
WEIGHT: 214.95 LBS | DIASTOLIC BLOOD PRESSURE: 60 MMHG | SYSTOLIC BLOOD PRESSURE: 121 MMHG | TEMPERATURE: 97 F | HEIGHT: 66 IN | RESPIRATION RATE: 18 BRPM | HEART RATE: 36 BPM | OXYGEN SATURATION: 99 %

## 2020-03-01 VITALS
RESPIRATION RATE: 19 BRPM | OXYGEN SATURATION: 97 % | HEART RATE: 64 BPM | TEMPERATURE: 98 F | DIASTOLIC BLOOD PRESSURE: 63 MMHG | SYSTOLIC BLOOD PRESSURE: 156 MMHG

## 2020-03-01 DIAGNOSIS — Z98.890 OTHER SPECIFIED POSTPROCEDURAL STATES: Chronic | ICD-10-CM

## 2020-03-01 DIAGNOSIS — Z95.1 PRESENCE OF AORTOCORONARY BYPASS GRAFT: Chronic | ICD-10-CM

## 2020-03-01 LAB
ALBUMIN SERPL ELPH-MCNC: 4 G/DL — SIGNIFICANT CHANGE UP (ref 3.3–5)
ALP SERPL-CCNC: 53 U/L — SIGNIFICANT CHANGE UP (ref 40–120)
ALT FLD-CCNC: 10 U/L — SIGNIFICANT CHANGE UP (ref 10–45)
ANION GAP SERPL CALC-SCNC: 12 MMOL/L — SIGNIFICANT CHANGE UP (ref 5–17)
APTT BLD: 34.4 SEC — SIGNIFICANT CHANGE UP (ref 27.5–36.3)
AST SERPL-CCNC: 19 U/L — SIGNIFICANT CHANGE UP (ref 10–40)
BASOPHILS # BLD AUTO: 0.02 K/UL — SIGNIFICANT CHANGE UP (ref 0–0.2)
BASOPHILS NFR BLD AUTO: 0.2 % — SIGNIFICANT CHANGE UP (ref 0–2)
BILIRUB SERPL-MCNC: 0.7 MG/DL — SIGNIFICANT CHANGE UP (ref 0.2–1.2)
BUN SERPL-MCNC: 41 MG/DL — HIGH (ref 7–23)
CALCIUM SERPL-MCNC: 9.2 MG/DL — SIGNIFICANT CHANGE UP (ref 8.4–10.5)
CHLORIDE SERPL-SCNC: 107 MMOL/L — SIGNIFICANT CHANGE UP (ref 96–108)
CO2 SERPL-SCNC: 20 MMOL/L — LOW (ref 22–31)
CREAT SERPL-MCNC: 1.64 MG/DL — HIGH (ref 0.5–1.3)
EOSINOPHIL # BLD AUTO: 0.06 K/UL — SIGNIFICANT CHANGE UP (ref 0–0.5)
EOSINOPHIL NFR BLD AUTO: 0.5 % — SIGNIFICANT CHANGE UP (ref 0–6)
GLUCOSE SERPL-MCNC: 146 MG/DL — HIGH (ref 70–99)
HCT VFR BLD CALC: 41.4 % — SIGNIFICANT CHANGE UP (ref 39–50)
HGB BLD-MCNC: 13.1 G/DL — SIGNIFICANT CHANGE UP (ref 13–17)
IMM GRANULOCYTES NFR BLD AUTO: 0.4 % — SIGNIFICANT CHANGE UP (ref 0–1.5)
INR BLD: 1.44 RATIO — HIGH (ref 0.88–1.16)
LYMPHOCYTES # BLD AUTO: 1.32 K/UL — SIGNIFICANT CHANGE UP (ref 1–3.3)
LYMPHOCYTES # BLD AUTO: 11.8 % — LOW (ref 13–44)
MCHC RBC-ENTMCNC: 31.3 PG — SIGNIFICANT CHANGE UP (ref 27–34)
MCHC RBC-ENTMCNC: 31.6 GM/DL — LOW (ref 32–36)
MCV RBC AUTO: 98.8 FL — SIGNIFICANT CHANGE UP (ref 80–100)
MONOCYTES # BLD AUTO: 0.62 K/UL — SIGNIFICANT CHANGE UP (ref 0–0.9)
MONOCYTES NFR BLD AUTO: 5.6 % — SIGNIFICANT CHANGE UP (ref 2–14)
NEUTROPHILS # BLD AUTO: 9.08 K/UL — HIGH (ref 1.8–7.4)
NEUTROPHILS NFR BLD AUTO: 81.5 % — HIGH (ref 43–77)
NRBC # BLD: 0 /100 WBCS — SIGNIFICANT CHANGE UP (ref 0–0)
PLATELET # BLD AUTO: 166 K/UL — SIGNIFICANT CHANGE UP (ref 150–400)
POTASSIUM SERPL-MCNC: 4.2 MMOL/L — SIGNIFICANT CHANGE UP (ref 3.5–5.3)
POTASSIUM SERPL-SCNC: 4.2 MMOL/L — SIGNIFICANT CHANGE UP (ref 3.5–5.3)
PROT SERPL-MCNC: 7.8 G/DL — SIGNIFICANT CHANGE UP (ref 6–8.3)
PROTHROM AB SERPL-ACNC: 16.6 SEC — HIGH (ref 10–12.9)
RBC # BLD: 4.19 M/UL — LOW (ref 4.2–5.8)
RBC # FLD: 14.3 % — SIGNIFICANT CHANGE UP (ref 10.3–14.5)
SODIUM SERPL-SCNC: 139 MMOL/L — SIGNIFICANT CHANGE UP (ref 135–145)
WBC # BLD: 11.15 K/UL — HIGH (ref 3.8–10.5)
WBC # FLD AUTO: 11.15 K/UL — HIGH (ref 3.8–10.5)

## 2020-03-01 PROCEDURE — 99284 EMERGENCY DEPT VISIT MOD MDM: CPT | Mod: 25

## 2020-03-01 PROCEDURE — 96374 THER/PROPH/DIAG INJ IV PUSH: CPT

## 2020-03-01 PROCEDURE — 80053 COMPREHEN METABOLIC PANEL: CPT

## 2020-03-01 PROCEDURE — 93005 ELECTROCARDIOGRAM TRACING: CPT

## 2020-03-01 PROCEDURE — 99284 EMERGENCY DEPT VISIT MOD MDM: CPT | Mod: GC

## 2020-03-01 PROCEDURE — 85610 PROTHROMBIN TIME: CPT

## 2020-03-01 PROCEDURE — 85027 COMPLETE CBC AUTOMATED: CPT

## 2020-03-01 PROCEDURE — 85730 THROMBOPLASTIN TIME PARTIAL: CPT

## 2020-03-01 PROCEDURE — 93010 ELECTROCARDIOGRAM REPORT: CPT

## 2020-03-01 RX ORDER — MORPHINE SULFATE 50 MG/1
4 CAPSULE, EXTENDED RELEASE ORAL ONCE
Refills: 0 | Status: DISCONTINUED | OUTPATIENT
Start: 2020-03-01 | End: 2020-03-01

## 2020-03-01 RX ORDER — SODIUM CHLORIDE 9 MG/ML
1000 INJECTION INTRAMUSCULAR; INTRAVENOUS; SUBCUTANEOUS ONCE
Refills: 0 | Status: COMPLETED | OUTPATIENT
Start: 2020-03-01 | End: 2020-03-01

## 2020-03-01 RX ADMIN — SODIUM CHLORIDE 1000 MILLILITER(S): 9 INJECTION INTRAMUSCULAR; INTRAVENOUS; SUBCUTANEOUS at 11:32

## 2020-03-01 RX ADMIN — MORPHINE SULFATE 4 MILLIGRAM(S): 50 CAPSULE, EXTENDED RELEASE ORAL at 12:14

## 2020-03-01 RX ADMIN — MORPHINE SULFATE 4 MILLIGRAM(S): 50 CAPSULE, EXTENDED RELEASE ORAL at 12:45

## 2020-03-01 NOTE — ED ADULT NURSE NOTE - NSIMPLEMENTINTERV_GEN_ALL_ED
Implemented All Fall with Harm Risk Interventions:  [place] to call system. Call bell, personal items and telephone within reach. Instruct patient to call for assistance. Room bathroom lighting operational. Non-slip footwear when patient is off stretcher. Physically safe environment: no spills, clutter or unnecessary equipment. Stretcher in lowest position, wheels locked, appropriate side rails in place. Provide visual cue, wrist band, yellow gown, etc. Monitor gait and stability. Monitor for mental status changes and reorient to person, place, and time. Review medications for side effects contributing to fall risk. Reinforce activity limits and safety measures with patient and family. Provide visual clues: red socks.

## 2020-03-01 NOTE — ED PROVIDER NOTE - OBJECTIVE STATEMENT
89M w/ h/o ventral abdominal hernia for 20 years, CABG x3, CAD s/p stent 7/11/19 p/w abdominal pain hernia site worsening x1 week, reports is pending cardiac clearance for corrective surgical repair, followed up [facility], surg, mild nausea, no vomiting, no fever, has had multiple recent visits for same cc, this episode c/w prior. Took APAP at home without improvement. Is able to reduce hernia. Denies /v/f/c/cp/sob. Denies headache, syncope, lightheadedness, dizziness. Denies chest palpitations, Denies dysuria, hematuria, hematochezia, BRBPR, tarry stools, diarrhea, constipation.

## 2020-03-01 NOTE — ED PROVIDER NOTE - PATIENT PORTAL LINK FT
You can access the FollowMyHealth Patient Portal offered by [facility] by registering at the following website: [email or web address]. By joining [employer]’s FollowMyHealth portal, you will also be able to view your health information using other applications (apps) compatible with our system.

## 2020-03-01 NOTE — ED ADULT NURSE NOTE - OBJECTIVE STATEMENT
89 yo male presents to the ED via waiting room from home complaining of abdominal pain since early this morning at 03:00. PMH of CHF, TURP, stent in July 2019 on plavix and eliquis, triple bypass 30 yrs ago. Patient states he has had an abdominal hernia "for years." Pain woke up patient from sleep and took Tylenol at 03:00 and then again at 08:30 with no relief. States pain is constant and a dull feeling. Patient states MDs wanted to do surgery but wanted to hold off due to recent stent in July. Abdomen is soft, non-tender to palpation at this time. Patient states hernia looks bigger and has continued to grow in size. LBM was this morning and was normal. States feeling nauseous this morning. Denies headache, dizziness, vision changes, chest pain, shortness of breath, vomiting, diarrhea, fevers, chills, dysuria, hematuria, recent illness travel.

## 2020-03-01 NOTE — ED PROVIDER NOTE - CLINICAL SUMMARY MEDICAL DECISION MAKING FREE TEXT BOX
Aga PGY3: 89M w/ h/o ventral abdominal hernia for 20 years, CABG x3, CAD s/p stent 7/11/19 p/w abdominal pain hernia site worsening x1 week, reports is pending cardiac clearance for corrective surgical repair, followed up [facility], surg, mild nausea, no vomiting, no fever, has had multiple recent visits for same cc, this episode c/w prior. exam vss non toxic appearing large ventral hernia reducible, low c/f strangulation or incarceration will check labs, discuss with surg, reassess Jayeshpramod PGY3: 89M w/ h/o ventral abdominal hernia for 20 years, CABG x3, CAD s/p stent 7/11/19 p/w abdominal pain hernia site worsening x1 week, reports is pending cardiac clearance for corrective surgical repair, followed up [facility], surg, mild nausea, no vomiting, no fever, has had multiple recent visits for same cc, this episode c/w prior. exam vss non toxic appearing large ventral hernia reducible, low c/f strangulation or incarceration will check labs, discuss with surg, reassess  Radha DOWLING: unable to get in touch with Dr. Mcnamara - patient's pain controlled and exam with reducible hernia. He feels comfortable with outpatient follow up.

## 2020-03-01 NOTE — ED PROVIDER NOTE - ATTENDING CONTRIBUTION TO CARE
Patient is a 89 yo M with history of DM, HTN CAD s/p CABG x3, s/p stent on 7/11/19, known ventral hernia here for evaluation of pain around his ventral hernia since 3 AM. Patient reports frequent intermittent episodes of pain around his hernia and has been to the hospital 3 other times to be evaluated for pain. He is awaiting clearance for surgery by his cardiologist and was told he needs to wait 6 months and then 1 year by a different cardiologist. He states the two doctors need to speak to each other to clear him. This pain is similar to prior epsodes. + nausea. Since this morning, pain has subsided. LBM this morning, described as normal without blood and not black. No fevers. No radiation of pain. No urinary symptoms.     VS noted  Gen. no acute distress, Non toxic   HEENT: EOMI, mmm  Lungs: CTAB/L no C/ W /R   CVS: RRR   Abd; Soft non tender, non distended ,large ventral hernia, soft, reducible and non-tender  Ext: no edema  Skin: no rash  Neuro AAOx3 non focal clear speech  a/p: ventral hernia - reducible, plan for pain control and labs. Pain has subsided. Discussed with patient and his daughter, no imaging for now given recent imaging done for similar episode on 2/21/2020.   - Radha DOWLING

## 2020-03-01 NOTE — ED PROVIDER NOTE - PROGRESS NOTE DETAILS
Aga PGY3: Pt assessed at beside. Pt resting comfortably, pain controlled, pt questions answered. Pain improved after meds agrees with plan for dc, abdomen soft hernia reducible. Will fu outpt with surg, strict returns given.

## 2020-03-01 NOTE — ED PROVIDER NOTE - PHYSICAL EXAMINATION
GEN APPEARANCE: WDWN, alert and cooperative, non-toxic appearing and in NAD  HEAD: Atraumatic, normocephalic   EYES: PERRLa, EOMI, vision grossly intact.   EARS: Gross hearing intact.   NOSE: No nasal discharge, no external evidence of epistaxis.   NECK: Supple  CV: RRR, S1S2, no c/r/m/g. No cyanosis or pallor. Extremities warm, well perfused. Cap refill <2 seconds. No bruits.   LUNGS: CTAB. No wheezing. No rales. No rhonchi. No diminished breath sounds.   ABDOMEN: large reducible soft ventral hernia without skin changes, compressible.   MSK: Spine appears normal, no spine point tenderness. No CVA ttp. No joint erythema or tenderness. Normal muscular development. Pelvis stable.  EXTREMITIES: No peripheral edema. No obvious joint or bony deformity.  NEURO: Alert, follows commands. Weight bearing normal. Speech normal. Sensation and motor normal x4 extremities.   SKIN: Normal color for race, warm, dry and intact. No evidence of rash.  PSYCH: Normal mood and affect.

## 2020-06-16 ENCOUNTER — APPOINTMENT (OUTPATIENT)
Dept: CARDIOLOGY | Facility: CLINIC | Age: 85
End: 2020-06-16
Payer: MEDICARE

## 2020-06-16 ENCOUNTER — APPOINTMENT (OUTPATIENT)
Dept: SURGERY | Facility: CLINIC | Age: 85
End: 2020-06-16
Payer: MEDICARE

## 2020-06-16 VITALS
HEART RATE: 65 BPM | BODY MASS INDEX: 39.69 KG/M2 | OXYGEN SATURATION: 100 % | SYSTOLIC BLOOD PRESSURE: 165 MMHG | DIASTOLIC BLOOD PRESSURE: 67 MMHG | WEIGHT: 224 LBS | HEIGHT: 63 IN

## 2020-06-16 DIAGNOSIS — Z78.9 OTHER SPECIFIED HEALTH STATUS: ICD-10-CM

## 2020-06-16 DIAGNOSIS — Z82.49 FAMILY HISTORY OF ISCHEMIC HEART DISEASE AND OTHER DISEASES OF THE CIRCULATORY SYSTEM: ICD-10-CM

## 2020-06-16 DIAGNOSIS — I82.409 ACUTE EMBOLISM AND THROMBOSIS OF UNSPECIFIED DEEP VEINS OF UNSPECIFIED LOWER EXTREMITY: ICD-10-CM

## 2020-06-16 PROCEDURE — 93000 ELECTROCARDIOGRAM COMPLETE: CPT

## 2020-06-16 PROCEDURE — 99203 OFFICE O/P NEW LOW 30 MIN: CPT

## 2020-06-16 PROCEDURE — 99214 OFFICE O/P EST MOD 30 MIN: CPT

## 2020-06-16 RX ORDER — ASPIRIN 81 MG
81 TABLET, DELAYED RELEASE (ENTERIC COATED) ORAL
Refills: 0 | Status: DISCONTINUED | COMMUNITY
End: 2020-06-16

## 2020-06-16 RX ORDER — NIACIN 1000 MG/1
TABLET, FILM COATED, EXTENDED RELEASE ORAL
Refills: 0 | Status: DISCONTINUED | COMMUNITY
End: 2020-06-16

## 2020-06-16 RX ORDER — CITALOPRAM 20 MG/1
20 TABLET, FILM COATED ORAL
Refills: 0 | Status: DISCONTINUED | COMMUNITY
End: 2020-06-16

## 2020-06-16 RX ORDER — FUROSEMIDE 80 MG/1
TABLET ORAL
Refills: 0 | Status: DISCONTINUED | COMMUNITY
End: 2020-06-16

## 2020-06-16 RX ORDER — FENOFIBRATE 160 MG/1
160 TABLET ORAL DAILY
Qty: 90 | Refills: 2 | Status: ACTIVE | COMMUNITY
Start: 2020-06-16

## 2020-06-16 RX ORDER — CARVEDILOL 12.5 MG/1
12.5 TABLET, FILM COATED ORAL TWICE DAILY
Refills: 0 | Status: ACTIVE | COMMUNITY

## 2020-06-16 RX ORDER — AMLODIPINE BESYLATE 5 MG/1
5 TABLET ORAL DAILY
Refills: 0 | Status: ACTIVE | COMMUNITY

## 2020-06-16 RX ORDER — FINASTERIDE 5 MG/1
5 TABLET, FILM COATED ORAL DAILY
Qty: 90 | Refills: 3 | Status: ACTIVE | COMMUNITY
Start: 2020-06-16

## 2020-06-22 NOTE — PHYSICAL EXAM
[Respiratory Effort] : normal respiratory effort [Normal Rate and Rhythm] : normal rate and rhythm [Abdominal Aorta] : Normal abdominal aorta [Abdominal Masses] : Abdominal mass present [Abdomen Tenderness] : ~T ~M No abdominal tenderness [No HSM] : no hepatosplenomegaly [Skin Ulcer] : ulcer [Alert] : alert [Oriented to Person] : oriented to person [Oriented to Place] : oriented to place [Oriented to Time] : oriented to time [Calm] : calm [de-identified] : WD/WN man in NAD [de-identified] : NCAT no scleral icterus [de-identified] : Reducible midline hernia of moderate size. [de-identified] : Walks with aid

## 2020-06-22 NOTE — REVIEW OF SYSTEMS
[Recent Weight Loss (___ Lbs)] : recent [unfilled] ~Ulb weight loss [As Noted in HPI] : as noted in HPI [Abdominal Pain] : abdominal pain [Arthralgias] : arthralgias [Negative] : Heme/Lymph
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
## 2020-06-23 ENCOUNTER — NON-APPOINTMENT (OUTPATIENT)
Age: 85
End: 2020-06-23

## 2020-06-23 PROBLEM — I82.409 DVT (DEEP VENOUS THROMBOSIS): Status: ACTIVE | Noted: 2020-06-23

## 2020-06-23 NOTE — PHYSICAL EXAM
[Normal Appearance] : normal appearance [General Appearance - Well Developed] : well developed [General Appearance - Well Nourished] : well nourished [Well Groomed] : well groomed [General Appearance - In No Acute Distress] : no acute distress [No Deformities] : no deformities [Eyelids - No Xanthelasma] : the eyelids demonstrated no xanthelasmas [Normal Conjunctiva] : the conjunctiva exhibited no abnormalities [Normal Oral Mucosa] : normal oral mucosa [Normal Jugular Venous A Waves Present] : normal jugular venous A waves present [No Oral Cyanosis] : no oral cyanosis [No Oral Pallor] : no oral pallor [Normal Jugular Venous V Waves Present] : normal jugular venous V waves present [No Jugular Venous Bills A Waves] : no jugular venous bills A waves [Heart Sounds] : normal S1 and S2 [Murmurs] : no murmurs present [Heart Rate And Rhythm] : heart rate and rhythm were normal [Respiration, Rhythm And Depth] : normal respiratory rhythm and effort [Exaggerated Use Of Accessory Muscles For Inspiration] : no accessory muscle use [Auscultation Breath Sounds / Voice Sounds] : lungs were clear to auscultation bilaterally [Abdomen Soft] : soft [Abdomen Tenderness] : non-tender [Abdomen Mass (___ Cm)] : no abdominal mass palpated [Abnormal Walk] : normal gait [Gait - Sufficient For Exercise Testing] : the gait was sufficient for exercise testing [Nail Clubbing] : no clubbing of the fingernails [Cyanosis, Localized] : no localized cyanosis [Petechial Hemorrhages (___cm)] : no petechial hemorrhages [Skin Color & Pigmentation] : normal skin color and pigmentation [] : no rash [Skin Lesions] : no skin lesions [No Venous Stasis] : no venous stasis [No Skin Ulcers] : no skin ulcer [No Xanthoma] : no  xanthoma was observed [Oriented To Time, Place, And Person] : oriented to person, place, and time [Affect] : the affect was normal [Mood] : the mood was normal [No Anxiety] : not feeling anxious

## 2020-07-09 ENCOUNTER — OUTPATIENT (OUTPATIENT)
Dept: OUTPATIENT SERVICES | Facility: HOSPITAL | Age: 85
LOS: 1 days | End: 2020-07-09
Payer: MEDICARE

## 2020-07-09 VITALS
TEMPERATURE: 99 F | SYSTOLIC BLOOD PRESSURE: 147 MMHG | DIASTOLIC BLOOD PRESSURE: 75 MMHG | RESPIRATION RATE: 18 BRPM | HEART RATE: 68 BPM | OXYGEN SATURATION: 96 % | HEIGHT: 64 IN | WEIGHT: 225.09 LBS

## 2020-07-09 DIAGNOSIS — K43.6 OTHER AND UNSPECIFIED VENTRAL HERNIA WITH OBSTRUCTION, WITHOUT GANGRENE: ICD-10-CM

## 2020-07-09 DIAGNOSIS — Z98.890 OTHER SPECIFIED POSTPROCEDURAL STATES: Chronic | ICD-10-CM

## 2020-07-09 DIAGNOSIS — Z87.19 PERSONAL HISTORY OF OTHER DISEASES OF THE DIGESTIVE SYSTEM: Chronic | ICD-10-CM

## 2020-07-09 DIAGNOSIS — Z95.1 PRESENCE OF AORTOCORONARY BYPASS GRAFT: Chronic | ICD-10-CM

## 2020-07-09 DIAGNOSIS — Z86.718 PERSONAL HISTORY OF OTHER VENOUS THROMBOSIS AND EMBOLISM: ICD-10-CM

## 2020-07-09 DIAGNOSIS — K43.9 VENTRAL HERNIA WITHOUT OBSTRUCTION OR GANGRENE: ICD-10-CM

## 2020-07-09 DIAGNOSIS — Z01.818 ENCOUNTER FOR OTHER PREPROCEDURAL EXAMINATION: ICD-10-CM

## 2020-07-09 DIAGNOSIS — I50.9 HEART FAILURE, UNSPECIFIED: ICD-10-CM

## 2020-07-09 PROCEDURE — 87641 MR-STAPH DNA AMP PROBE: CPT

## 2020-07-09 PROCEDURE — G0463: CPT

## 2020-07-09 PROCEDURE — 86900 BLOOD TYPING SEROLOGIC ABO: CPT

## 2020-07-09 PROCEDURE — 86901 BLOOD TYPING SEROLOGIC RH(D): CPT

## 2020-07-09 PROCEDURE — 86850 RBC ANTIBODY SCREEN: CPT

## 2020-07-09 PROCEDURE — 80048 BASIC METABOLIC PNL TOTAL CA: CPT

## 2020-07-09 PROCEDURE — 85027 COMPLETE CBC AUTOMATED: CPT

## 2020-07-09 PROCEDURE — 87640 STAPH A DNA AMP PROBE: CPT

## 2020-07-09 RX ORDER — CITALOPRAM 10 MG/1
1 TABLET, FILM COATED ORAL
Qty: 0 | Refills: 0 | DISCHARGE

## 2020-07-09 RX ORDER — ASPIRIN/CALCIUM CARB/MAGNESIUM 324 MG
1 TABLET ORAL
Qty: 0 | Refills: 0 | DISCHARGE

## 2020-07-09 RX ORDER — TAMSULOSIN HYDROCHLORIDE 0.4 MG/1
0 CAPSULE ORAL
Qty: 0 | Refills: 0 | DISCHARGE

## 2020-07-09 RX ORDER — FENOFIBRATE,MICRONIZED 130 MG
1 CAPSULE ORAL
Qty: 0 | Refills: 0 | DISCHARGE

## 2020-07-09 RX ORDER — FUROSEMIDE 40 MG
1 TABLET ORAL
Qty: 0 | Refills: 0 | DISCHARGE

## 2020-07-09 RX ORDER — CARVEDILOL PHOSPHATE 80 MG/1
1.5 CAPSULE, EXTENDED RELEASE ORAL
Qty: 0 | Refills: 0 | DISCHARGE

## 2020-07-09 RX ORDER — LEVOTHYROXINE SODIUM 125 MCG
1 TABLET ORAL
Qty: 0 | Refills: 0 | DISCHARGE

## 2020-07-09 RX ORDER — POTASSIUM CHLORIDE 20 MEQ
1 PACKET (EA) ORAL
Qty: 0 | Refills: 0 | DISCHARGE

## 2020-07-09 RX ORDER — AMLODIPINE BESYLATE 2.5 MG/1
1 TABLET ORAL
Qty: 0 | Refills: 0 | DISCHARGE

## 2020-07-09 RX ORDER — FINASTERIDE 5 MG/1
1 TABLET, FILM COATED ORAL
Qty: 0 | Refills: 0 | DISCHARGE

## 2020-07-09 RX ORDER — BUPROPION HYDROCHLORIDE 150 MG/1
150 TABLET, EXTENDED RELEASE ORAL
Qty: 0 | Refills: 0 | DISCHARGE

## 2020-07-09 RX ORDER — CARVEDILOL PHOSPHATE 80 MG/1
37.5 CAPSULE, EXTENDED RELEASE ORAL
Qty: 0 | Refills: 0 | DISCHARGE

## 2020-07-09 RX ORDER — NIACIN 50 MG
1 TABLET ORAL
Qty: 0 | Refills: 0 | DISCHARGE

## 2020-07-09 RX ORDER — APIXABAN 2.5 MG/1
1 TABLET, FILM COATED ORAL
Qty: 0 | Refills: 0 | DISCHARGE

## 2020-07-09 NOTE — H&P PST ADULT - HISTORY OF PRESENT ILLNESS
89yr old male with hx of ventral hernia and presented to the ED multiple times   with symptoms  of incarceration. Pt was not a surgical candidate due to CHF.  But cardiac stent was placed last year and Pt symptoms continued. Pt returns presents  for ventral hernia repair. Pt was told by Dr. Arizmendi to stop Eliquis 3 days before  but not stop plavix spoke with , Call was made to Dr. Mcnamara to see if this altered antiplatelet  is ok for surgery

## 2020-07-09 NOTE — H&P PST ADULT - NSICDXFAMILYHX_GEN_ALL_CORE_FT
FAMILY HISTORY:  Sibling  Still living? No  Family history of leukemia, Age at diagnosis: Age Unknown

## 2020-07-09 NOTE — H&P PST ADULT - NSICDXPASTMEDICALHX_GEN_ALL_CORE_FT
PAST MEDICAL HISTORY:  CAD (coronary artery disease) of artery bypass graft     Cellulitis left leg 2018    DVT (deep venous thrombosis)     H/O CHF     HTN (hypertension)     Hypertension     Hypothyroidism     Stented coronary artery 7/2019 and 2010    Ventral hernia

## 2020-07-09 NOTE — H&P PST ADULT - NSICDXPASTSURGICALHX_GEN_ALL_CORE_FT
PAST SURGICAL HISTORY:  H/O right knee surgery     History of circumcision adult    History of gastric ulcer surgicalyy repaired 1990    S/P CABG (coronary artery bypass graft) x3 1990    S/P hemorrhoidectomy

## 2020-07-09 NOTE — H&P PST ADULT - NSICDXPROBLEM_GEN_ALL_CORE_FT
PROBLEM DIAGNOSES  Problem: Other ventral hernia without obstruction or gangrene  Assessment and Plan: coming in for incarcerated ventral hernia repair    Problem: Chronic CHF  Assessment and Plan: As per cardiology Pt to continue with coreg and lasix    Problem: History of DVT of lower extremity  Assessment and Plan: Dr. Arizmendi told Pt to stop Eliquis and continue with Plavix

## 2020-07-14 ENCOUNTER — APPOINTMENT (OUTPATIENT)
Dept: SURGERY | Facility: CLINIC | Age: 85
End: 2020-07-14
Payer: MEDICARE

## 2020-07-14 VITALS — TEMPERATURE: 97.7 F

## 2020-07-14 DIAGNOSIS — K43.6 OTHER AND UNSPECIFIED VENTRAL HERNIA WITH OBSTRUCTION, W/OUT GANGRENE: ICD-10-CM

## 2020-07-14 PROCEDURE — 99214 OFFICE O/P EST MOD 30 MIN: CPT

## 2020-07-17 PROBLEM — Z82.49 FAMILY HISTORY OF CORONARY ARTERY DISEASE: Status: ACTIVE | Noted: 2020-07-17

## 2020-07-17 PROBLEM — Z78.9 CURRENT NON-SMOKER: Status: ACTIVE | Noted: 2020-07-17

## 2020-07-17 NOTE — HISTORY OF PRESENT ILLNESS
[FreeTextEntry1] : Mr. Bobo is a 89 year-old man with known HTN, DVT on AC, CHF, CAD and cellultits who presents for followup. he notes persistent edema at times and remains on AC with mild brusing.\par \par Active issues:\par 1. Actively treated HTN\par 2. Actively treated congestive systolic CHF\par 3. Actively treated DVT on AC\par 4. Actively treated CAD

## 2020-07-17 NOTE — DISCUSSION/SUMMARY
[FreeTextEntry1] : Mr. Bobo is a 89 year-old man with CAD/CHF.\par \par Plan:\par 1. Given the CHF - c/w diuresis and carvedilol.\par 2. GIven the dx of DVT - c/w AC - pt at high bleeding risk given Age\par 3. c/w secondary prev meds. Patient may proceed with surgery at high risk given his comorbidities without any further CV workup at this time. Must remain on all meds laura-procedureally, especially must NOT stop clopidogrel given his stents.\par 4. Old records requested and reviewed with performing physician.\par 5. Primary and secondary prevention of cardiovascular and related conditions discussed at length, including but not limited to diet and lifestyle modification.\par 6. Patient to return to the office in 3 months.\par \par Thank you for allowing me to participate in the care of your patient. If you have any questions, please feel free to contact me at [phone] or via email at [email or web address].\par \par Sincerely,\par \par Chalino Arizmendi MD [facility]\par Director of Interventional Cardiology\par Director CHIP/ Program

## 2020-07-18 PROBLEM — K43.6 HERNIA, VENTRAL, WITH OBSTRUCTION: Status: ACTIVE | Noted: 2019-07-07

## 2020-07-18 NOTE — PHYSICAL EXAM
[Respiratory Effort] : normal respiratory effort [Normal Rate and Rhythm] : normal rate and rhythm [Abdominal Masses] : Abdominal mass present [Abdominal Aorta] : Normal abdominal aorta [No HSM] : no hepatosplenomegaly [Abdomen Tenderness] : ~T ~M No abdominal tenderness [Skin Ulcer] : ulcer [Alert] : alert [Oriented to Person] : oriented to person [Oriented to Place] : oriented to place [Oriented to Time] : oriented to time [de-identified] : NCAT no scleral icterus [Calm] : calm [de-identified] : WD/WN man in NAD [de-identified] : Reducible midline hernia of moderate size. [de-identified] : Walks with aid

## 2020-07-18 NOTE — REVIEW OF SYSTEMS
[Recent Weight Loss (___ Lbs)] : recent [unfilled] ~Ulb weight loss [Heart Rate Is Slow] : slow heart rate [Heart Rate Is Fast] : fast heart rate [Sleep Disturbances] : sleep disturbances [Easy Bleeding] : a tendency for easy bleeding [Negative] : Endocrine

## 2020-07-18 NOTE — HISTORY OF PRESENT ILLNESS
[de-identified] : The patient presented with a large ventral hernia which has caused him to present to the [facility] ED multiple times with symptoms consistent with incarceration. In these instances the symptoms resolved and due to his advanced age and history of congestive heart failure he was deemed not a candidate for urgent repair. The instances have recurred however over the last 8 months. In addition his CHF led to a workup and placement of cardiac stents. He also has had  DVT and is on Plavix and Eliquis. Most recently he was scheduled for operative repair, however due to the situation with COVID-19 he was postponed. He presents without symptoms since he was last seen. The cardiac situation requires that the antiplatelet medications be continued through surgery which increases the risk of surgery significantly. Thus we discussed further delaying any surgical intervention in light of his lack of recent symptoms.

## 2020-07-20 ENCOUNTER — APPOINTMENT (OUTPATIENT)
Dept: DISASTER EMERGENCY | Facility: CLINIC | Age: 85
End: 2020-07-20

## 2020-07-20 DIAGNOSIS — Z01.818 ENCOUNTER FOR OTHER PREPROCEDURAL EXAMINATION: ICD-10-CM

## 2020-09-09 NOTE — ED PROVIDER NOTE - PHYSICAL EXAMINATION
GENERAL: no acute distress, non-toxic appearing, AAOx3  HEAD: normocephalic, atraumatic  HEENT: normal conjunctiva, oral mucosa moist, neck supple  CARDIAC: regular rate and rhythm, normal S1 and S2, systolic murmurs  PULM: normal breath sounds, clear to ascultation bilaterally, no rales, rhonchi, or wheezing  GI: abdomen nondistended with reducible hernia with no skin changes, soft, nontender, no guarding or rebound tenderness  : no CVA tenderness b/l, no suprapubic tenderness  NEURO: no focal motor or sensory deficits  MSK: no peripheral edema, no calf tenderness b/l, no visible deformities  SKIN: well-perfused, extremities warm, no visible rashes  PSYCH: appropriate mood and affect Continue with ASA 81 mg PO Daily.   Hold BB for now 2/2 AVB  --> EP following   Continue AC on Coumadin  Daily PT/ INR   Increase activity as tolerated.   Encourage Chest PT / Pulmonary toileting and Incentive spirometry every 1 hour x 10 while awake.   Continue with PUD prophylaxis.   Shower on POD #5.   D/C plan Subacute Rehab once medically cleared   Plan of care discussed with attending Continue with ASA 81 mg PO Daily.   Hold BB for now 2/2 AVB  --> EP following   Continue AC on Coumadin  Daily PT/ INR   Increase activity as tolerated.   Encourage Chest PT / Pulmonary toileting and Incentive spirometry every 1 hour x 10 while awake.   Continue with PUD prophylaxis.   Shower daily.   D/C plan Subacute Rehab Friday  Plan of care discussed with attending
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
## 2021-02-09 PROBLEM — Z95.5 PRESENCE OF CORONARY ANGIOPLASTY IMPLANT AND GRAFT: Chronic | Status: ACTIVE | Noted: 2017-04-13

## 2021-02-09 PROBLEM — E03.9 HYPOTHYROIDISM, UNSPECIFIED: Chronic | Status: ACTIVE | Noted: 2020-07-09

## 2021-02-09 PROBLEM — Z86.79 PERSONAL HISTORY OF OTHER DISEASES OF THE CIRCULATORY SYSTEM: Chronic | Status: ACTIVE | Noted: 2020-07-09

## 2021-02-09 PROBLEM — L03.90 CELLULITIS, UNSPECIFIED: Chronic | Status: ACTIVE | Noted: 2020-07-09

## 2021-03-02 ENCOUNTER — APPOINTMENT (OUTPATIENT)
Dept: CARDIOLOGY | Facility: CLINIC | Age: 86
End: 2021-03-02
Payer: MEDICARE

## 2021-03-02 ENCOUNTER — NON-APPOINTMENT (OUTPATIENT)
Age: 86
End: 2021-03-02

## 2021-03-02 VITALS
DIASTOLIC BLOOD PRESSURE: 74 MMHG | OXYGEN SATURATION: 98 % | HEART RATE: 70 BPM | HEIGHT: 63 IN | BODY MASS INDEX: 41.11 KG/M2 | WEIGHT: 232 LBS | SYSTOLIC BLOOD PRESSURE: 114 MMHG

## 2021-03-02 DIAGNOSIS — I25.10 ATHEROSCLEROTIC HEART DISEASE OF NATIVE CORONARY ARTERY W/OUT ANGINA PECTORIS: ICD-10-CM

## 2021-03-02 DIAGNOSIS — I10 ESSENTIAL (PRIMARY) HYPERTENSION: ICD-10-CM

## 2021-03-02 DIAGNOSIS — Z95.828 PRESENCE OF OTHER VASCULAR IMPLANTS AND GRAFTS: ICD-10-CM

## 2021-03-02 DIAGNOSIS — E78.5 HYPERLIPIDEMIA, UNSPECIFIED: ICD-10-CM

## 2021-03-02 DIAGNOSIS — Z95.1 PRESENCE OF AORTOCORONARY BYPASS GRAFT: ICD-10-CM

## 2021-03-02 DIAGNOSIS — I35.0 NONRHEUMATIC AORTIC (VALVE) STENOSIS: ICD-10-CM

## 2021-03-02 PROCEDURE — 93000 ELECTROCARDIOGRAM COMPLETE: CPT

## 2021-03-02 PROCEDURE — 99214 OFFICE O/P EST MOD 30 MIN: CPT

## 2021-03-02 RX ORDER — DICYCLOMINE HYDROCHLORIDE 20 MG/1
20 TABLET ORAL
Refills: 0 | Status: ACTIVE | COMMUNITY
Start: 2021-03-02

## 2021-03-02 RX ORDER — TAMSULOSIN HYDROCHLORIDE 0.4 MG/1
0.4 CAPSULE ORAL
Qty: 30 | Refills: 3 | Status: ACTIVE | COMMUNITY
Start: 2021-03-02

## 2021-03-02 RX ORDER — APIXABAN 2.5 MG/1
2.5 TABLET, FILM COATED ORAL
Qty: 180 | Refills: 3 | Status: DISCONTINUED | COMMUNITY
Start: 2020-03-16 | End: 2021-03-02

## 2021-03-02 NOTE — PHYSICAL EXAM
[General Appearance - Well Developed] : well developed [Normal Appearance] : normal appearance [Well Groomed] : well groomed [General Appearance - Well Nourished] : well nourished [No Deformities] : no deformities [General Appearance - In No Acute Distress] : no acute distress [Normal Conjunctiva] : the conjunctiva exhibited no abnormalities [Eyelids - No Xanthelasma] : the eyelids demonstrated no xanthelasmas [Normal Oral Mucosa] : normal oral mucosa [No Oral Pallor] : no oral pallor [No Oral Cyanosis] : no oral cyanosis [Normal Jugular Venous A Waves Present] : normal jugular venous A waves present [Normal Jugular Venous V Waves Present] : normal jugular venous V waves present [No Jugular Venous Bills A Waves] : no jugular venous bills A waves [Heart Rate And Rhythm] : heart rate and rhythm were normal [Heart Sounds] : normal S1 and S2 [Systolic grade ___/6] : A grade [unfilled]/6 systolic murmur was heard. [Respiration, Rhythm And Depth] : normal respiratory rhythm and effort [Exaggerated Use Of Accessory Muscles For Inspiration] : no accessory muscle use [Auscultation Breath Sounds / Voice Sounds] : lungs were clear to auscultation bilaterally [Abdomen Soft] : soft [Abdomen Tenderness] : non-tender [Abdomen Mass (___ Cm)] : no abdominal mass palpated [Abnormal Walk] : normal gait [Gait - Sufficient For Exercise Testing] : the gait was sufficient for exercise testing [Nail Clubbing] : no clubbing of the fingernails [Cyanosis, Localized] : no localized cyanosis [Petechial Hemorrhages (___cm)] : no petechial hemorrhages [Skin Color & Pigmentation] : normal skin color and pigmentation [] : no rash [No Venous Stasis] : no venous stasis [Skin Lesions] : no skin lesions [No Skin Ulcers] : no skin ulcer [No Xanthoma] : no  xanthoma was observed [Oriented To Time, Place, And Person] : oriented to person, place, and time [Affect] : the affect was normal [Mood] : the mood was normal [No Anxiety] : not feeling anxious

## 2021-03-02 NOTE — DISCUSSION/SUMMARY
[FreeTextEntry1] : 1) Cardiac echo to evaluate Aortic Valve\par 2) Carotid doppler\par 3) Routine blood work\par 4) F/u in 3 months

## 2021-03-02 NOTE — REASON FOR VISIT
[Consultation] : a consultation regarding [Coronary Artery Disease] : coronary artery disease [Heart Failure] : congestive heart failure [Hyperlipidemia] : hyperlipidemia [Hypertension] : hypertension [FreeTextEntry1] : I saw this 89-year-old man in cardiac consultation on  03/02/21\par He had a triple coronary artery bypass procedure done 30 years ago, and had stenting of the vein graft to the right coronary artery 3 years ago.\par He has a history of hyperlipidemia hypertension, and episodes of CHF.\par [place] and [place] to establish care\par He has no complaints today

## 2021-04-21 ENCOUNTER — APPOINTMENT (OUTPATIENT)
Dept: CARDIOLOGY | Facility: CLINIC | Age: 86
End: 2021-04-21
Payer: MEDICARE

## 2021-04-21 PROCEDURE — 93306 TTE W/DOPPLER COMPLETE: CPT

## 2021-04-21 PROCEDURE — 93880 EXTRACRANIAL BILAT STUDY: CPT

## 2021-05-11 RX ORDER — CLOPIDOGREL BISULFATE 75 MG/1
75 TABLET, FILM COATED ORAL DAILY
Qty: 1 | Refills: 0 | Status: DISCONTINUED | COMMUNITY
Start: 2020-06-16 | End: 2021-05-11

## 2021-06-01 ENCOUNTER — APPOINTMENT (OUTPATIENT)
Dept: CARDIOLOGY | Facility: CLINIC | Age: 86
End: 2021-06-01

## 2022-09-29 NOTE — PROGRESS NOTE ADULT - PROBLEM/PLAN-8
DISPLAY PLAN FREE TEXT
all other ROS negative except as per HPI

## 2022-11-22 NOTE — ED PROVIDER NOTE - NEUROLOGICAL, MLM
[Breast Self Exam] : breast self exam
Alert and oriented, no focal deficits, no motor or sensory deficits.

## 2023-03-16 ENCOUNTER — NON-APPOINTMENT (OUTPATIENT)
Age: 88
End: 2023-03-16

## 2023-08-07 NOTE — ED ADULT NURSE NOTE - SUICIDE SCREENING QUESTION 2
Detail Level: Detailed Depth Of Biopsy: dermis Was A Bandage Applied: Yes Size Of Lesion In Cm: 0 Biopsy Type: H and E Biopsy Method: Personna blade Anesthesia Type: 1% lidocaine without epinephrine Anesthesia Volume In Cc (Will Not Render If 0): 0.5 Hemostasis: Drysol and Monsel's Wound Care: Petrolatum Dressing: Band-Aid Destruction After The Procedure: No Type Of Destruction Used: Electrodesiccation and Curettage Curettage Text: The wound bed was treated with curettage after the biopsy was performed. Cryotherapy Text: The wound bed was treated with cryotherapy after the biopsy was performed. Electrodesiccation Text: The wound bed was treated with electrodesiccation after the biopsy was performed. Electrodesiccation And Curettage Text: The wound bed was treated with electrodesiccation and curettage after the biopsy was performed. Silver Nitrate Text: The wound bed was treated with silver nitrate after the biopsy was performed. Lab: 6 Lab Facility: 3 Consent: Written consent was obtained and risks were reviewed including but not limited to scarring, infection, bleeding, scabbing, incomplete removal, nerve damage and allergy to anesthesia. Post-Care Instructions: I reviewed with the patient in detail post-care instructions. Patient is to  cleanse the biopsy site with mild soap and water twice a day, and then apply Vaseline (petroleum jelly) twice daily until healed. Notification Instructions: Patient will be notified of biopsy results. However, patient instructed to call the office if not contacted within 2 weeks. Billing Type: Third-Party Bill Information: Selecting Yes will display possible errors in your note based on the variables you have selected. This validation is only offered as a suggestion for you. PLEASE NOTE THAT THE VALIDATION TEXT WILL BE REMOVED WHEN YOU FINALIZE YOUR NOTE. IF YOU WANT TO FAX A PRELIMINARY NOTE YOU WILL NEED TO TOGGLE THIS TO 'NO' IF YOU DO NOT WANT IT IN YOUR FAXED NOTE. No

## 2023-10-24 NOTE — H&P PST ADULT - FALL HARM RISK TYPE OF ASSESSMENT
emergency declaration under the [address] [facility] authority and the [employer] General Act. A caregiver was present when appropriate. Ability to conduct physical exam was limited. The patient was located at home in a state where the provider was licensed to provide care. An electronic signature was used to authenticate this note. The patient and/or patient representative voiced understanding and agreement with the current diagnoses, recommendations, and possible side effects. No follow-up provider specified.       Viann Holstein, MD Admission

## 2023-12-08 ENCOUNTER — APPOINTMENT (OUTPATIENT)
Dept: ORTHOPEDIC SURGERY | Facility: CLINIC | Age: 88
End: 2023-12-08
Payer: MEDICARE

## 2023-12-08 DIAGNOSIS — L89.612 PRESSURE ULCER OF RIGHT HEEL, STAGE 2: ICD-10-CM

## 2023-12-08 DIAGNOSIS — L89.622 PRESSURE ULCER OF LEFT HEEL, STAGE 2: ICD-10-CM

## 2023-12-08 DIAGNOSIS — S90.219A CONTUSION OF UNSPECIFIED GREAT TOE WITH DAMAGE TO NAIL, INITIAL ENCOUNTER: ICD-10-CM

## 2023-12-08 DIAGNOSIS — E78.00 PURE HYPERCHOLESTEROLEMIA, UNSPECIFIED: ICD-10-CM

## 2023-12-08 PROCEDURE — 11730 AVULSION NAIL PLATE SIMPLE 1: CPT | Mod: T5

## 2023-12-08 PROCEDURE — 99204 OFFICE O/P NEW MOD 45 MIN: CPT | Mod: 25

## 2023-12-08 RX ORDER — FUROSEMIDE 40 MG/1
40 TABLET ORAL
Refills: 0 | Status: ACTIVE | COMMUNITY

## 2023-12-08 RX ORDER — FUROSEMIDE 20 MG/1
20 TABLET ORAL EVERY OTHER DAY
Refills: 0 | Status: ACTIVE | COMMUNITY
Start: 2020-06-16

## 2023-12-08 RX ORDER — BUPROPION HYDROCHLORIDE 150 MG/1
150 TABLET, EXTENDED RELEASE ORAL DAILY
Refills: 0 | Status: COMPLETED | COMMUNITY
Start: 2020-06-16 | End: 2023-12-08

## 2023-12-08 RX ORDER — CLOPIDOGREL 75 MG/1
75 TABLET, FILM COATED ORAL
Refills: 0 | Status: ACTIVE | COMMUNITY

## 2023-12-08 RX ORDER — APIXABAN 2.5 MG/1
2.5 TABLET, FILM COATED ORAL
Refills: 0 | Status: ACTIVE | COMMUNITY

## 2023-12-08 RX ORDER — CEPHALEXIN 250 MG/1
250 CAPSULE ORAL
Refills: 0 | Status: ACTIVE | COMMUNITY

## 2023-12-08 RX ORDER — ESCITALOPRAM OXALATE 10 MG/1
10 TABLET, FILM COATED ORAL
Refills: 0 | Status: ACTIVE | COMMUNITY

## 2023-12-08 RX ORDER — SPIRONOLACTONE AND HYDROCHLOROTHIAZIDE 25; 25 MG/1; MG/1
TABLET, FILM COATED ORAL
Refills: 0 | Status: ACTIVE | COMMUNITY

## 2024-01-20 ENCOUNTER — OFFICE (OUTPATIENT)
Dept: URBAN - METROPOLITAN AREA CLINIC 8 | Facility: CLINIC | Age: 89
Setting detail: OPHTHALMOLOGY
End: 2024-01-20
Payer: MEDICARE

## 2024-01-20 DIAGNOSIS — H52.4: ICD-10-CM

## 2024-01-20 DIAGNOSIS — H35.3113: ICD-10-CM

## 2024-01-20 DIAGNOSIS — H35.3221: ICD-10-CM

## 2024-01-20 DIAGNOSIS — H43.21: ICD-10-CM

## 2024-01-20 DIAGNOSIS — H16.223: ICD-10-CM

## 2024-01-20 DIAGNOSIS — Z96.1: ICD-10-CM

## 2024-01-20 PROCEDURE — 92014 COMPRE OPH EXAM EST PT 1/>: CPT | Performed by: OPHTHALMOLOGY

## 2024-01-20 PROCEDURE — 92015 DETERMINE REFRACTIVE STATE: CPT | Performed by: OPHTHALMOLOGY

## 2024-01-20 ASSESSMENT — REFRACTION_MANIFEST
OD_VA1: 20/60-2
OS_SPHERE: +1.50
OD_CYLINDER: -2.00
OD_VA1: 20/60-2
OD_VA2: 20/60(J6)
OD_AXIS: 110
OS_ADD: +3.25
OS_VA2: 20/60(J6)
OS_VA2: 20/60(J6)
OD_SPHERE: +1.25
OD_ADD: +3.50
OS_ADD: +3.50
OD_AXIS: 110
OD_SPHERE: +0.75
OD_ADD: +3.25
OU_VA: 20/60-2
OS_CYLINDER: -3.50
OS_VA1: CF @ 4FT
OS_SPHERE: BALANCE
OD_VA2: 20/60(J6)
OS_AXIS: 085
OD_CYLINDER: -1.75
OS_VA1: CF @ 4FT
OU_VA: 20/60-2

## 2024-01-20 ASSESSMENT — REFRACTION_CURRENTRX
OS_CYLINDER: -0.50
OS_SPHERE: +2.25
OD_VPRISM_DIRECTION: SV
OD_SPHERE: +3.25
OS_AXIS: 060
OS_VPRISM_DIRECTION: SV
OD_CYLINDER: -1.00
OS_OVR_VA: 20/
OD_AXIS: 094
OD_OVR_VA: 20/

## 2024-01-20 ASSESSMENT — CONFRONTATIONAL VISUAL FIELD TEST (CVF)
OS_FINDINGS: FULL
OD_FINDINGS: FULL

## 2024-01-20 ASSESSMENT — REFRACTION_AUTOREFRACTION
OD_CYLINDER: -2.00
OS_CYLINDER: -3.75
OS_AXIS: 083
OD_AXIS: 110
OD_SPHERE: +0.75
OS_SPHERE: +1.50

## 2024-01-20 ASSESSMENT — SPHEQUIV_DERIVED
OS_SPHEQUIV: -0.375
OS_SPHEQUIV: -0.25
OD_SPHEQUIV: -0.125
OD_SPHEQUIV: -0.25
OD_SPHEQUIV: 0.25

## 2024-01-20 ASSESSMENT — SUPERFICIAL PUNCTATE KERATITIS (SPK)
OD_SPK: T
OS_SPK: T

## 2024-05-30 ENCOUNTER — APPOINTMENT (OUTPATIENT)
Dept: INFECTIOUS DISEASE | Facility: CLINIC | Age: 89
End: 2024-05-30
Payer: MEDICARE

## 2024-05-30 VITALS
WEIGHT: 195 LBS | DIASTOLIC BLOOD PRESSURE: 52 MMHG | BODY MASS INDEX: 32.49 KG/M2 | OXYGEN SATURATION: 95 % | HEART RATE: 64 BPM | SYSTOLIC BLOOD PRESSURE: 110 MMHG | HEIGHT: 65 IN | TEMPERATURE: 96.5 F

## 2024-05-30 DIAGNOSIS — I10 ESSENTIAL (PRIMARY) HYPERTENSION: ICD-10-CM

## 2024-05-30 DIAGNOSIS — A04.72 ENTEROCOLITIS DUE TO CLOSTRIDIUM DIFFICILE, NOT SPECIFIED AS RECURRENT: ICD-10-CM

## 2024-05-30 DIAGNOSIS — K81.0 ACUTE CHOLECYSTITIS: ICD-10-CM

## 2024-05-30 DIAGNOSIS — R53.81 OTHER MALAISE: ICD-10-CM

## 2024-05-30 DIAGNOSIS — I50.9 HEART FAILURE, UNSPECIFIED: ICD-10-CM

## 2024-05-30 PROCEDURE — 99205 OFFICE O/P NEW HI 60 MIN: CPT

## 2024-05-30 NOTE — PHYSICAL EXAM
[General Appearance - Alert] : alert [General Appearance - In No Acute Distress] : in no acute distress [Sclera] : the sclera and conjunctiva were normal [PERRL With Normal Accommodation] : pupils were equal in size, round, reactive to light [Extraocular Movements] : extraocular movements were intact [Outer Ear] : the ears and nose were normal in appearance [Oropharynx] : the oropharynx was normal with no thrush [Neck Appearance] : the appearance of the neck was normal [Neck Cervical Mass (___cm)] : no neck mass was observed [Jugular Venous Distention Increased] : there was no jugular-venous distention [Thyroid Diffuse Enlargement] : the thyroid was not enlarged [Auscultation Breath Sounds / Voice Sounds] : lungs were clear to auscultation bilaterally [Heart Rate And Rhythm] : heart rate was normal and rhythm regular [Heart Sounds] : normal S1 and S2 [Heart Sounds Gallop] : no gallops [Murmurs] : no murmurs [Heart Sounds Pericardial Friction Rub] : no pericardial rub [Full Pulse] : the pedal pulses are present [Bowel Sounds] : normal bowel sounds [Abdomen Soft] : soft [Abdomen Tenderness] : non-tender [Abdomen Mass (___ Cm)] : no abdominal mass palpated [Costovertebral Angle Tenderness] : no CVA tenderness [No Palpable Adenopathy] : no palpable adenopathy [Musculoskeletal - Swelling] : no joint swelling [Nail Clubbing] : no clubbing  or cyanosis of the fingernails [Motor Tone] : muscle strength and tone were normal [] : no rash [FreeTextEntry1] : Chronic venous stasis changes of the lower legs [Cranial Nerves] : cranial nerves 2-12 were intact [Sensation] : the sensory exam was normal to light touch and pinprick [No Focal Deficits] : no focal deficits [Oriented To Time, Place, And Person] : oriented to person, place, and time [Affect] : the affect was normal

## 2024-05-30 NOTE — ASSESSMENT
[FreeTextEntry1] : Assessment: - 1. Resolved acute cholecystitis, status post cholecystostomy tube placement.  2. Resolved C. difficile infection (or possible colonization, with some uncertainty).  3. History of congestive heart failure, status post TAVR.  4. Deconditioning secondary to recent hospitalization and acute illness.  Plan: - 1. Follow-up with the surgeon regarding the cholecystostomy tube and potential for internalization, removal, or conversion to a different drainage approach.  2. No immediate follow-up with infectious disease is required, but a follow-up appointment can be scheduled in approximately 2 months to reassess his condition.  3. Advise the patient and family to inform his doctors if he requires antibiotics in the future, as prophylactic treatment for C. difficile (e.g., oral vancomycin) may be warranted to prevent recurrence of C diff infection.   4. Encourage the use of probiotics, such as yogurt, fermented foods, or probiotic supplements, to help restore gut amos after antibiotic use.  5. Continue with physical therapies (physical therapy, occupational therapy, nursing services) and work towards returning to cardiac rehabilitation once his condition improves.  6. Provide anticipatory guidance on the potential for incomplete functional recovery to his pre-heart failure baseline, given his advanced age.

## 2024-05-30 NOTE — REASON FOR VISIT
[Consultation] : a consultation visit [Family Member] : family member [Other: _____] : [unfilled] [FreeTextEntry1] : recent cholecystitis and C diff.

## 2024-06-17 DIAGNOSIS — Z83.3 FAMILY HISTORY OF DIABETES MELLITUS: ICD-10-CM

## 2024-06-17 DIAGNOSIS — Z78.9 OTHER SPECIFIED HEALTH STATUS: ICD-10-CM

## 2024-06-17 DIAGNOSIS — Z87.891 PERSONAL HISTORY OF NICOTINE DEPENDENCE: ICD-10-CM

## 2024-06-17 DIAGNOSIS — N18.9 CHRONIC KIDNEY DISEASE, UNSPECIFIED: ICD-10-CM

## 2024-06-17 DIAGNOSIS — Z86.59 PERSONAL HISTORY OF OTHER MENTAL AND BEHAVIORAL DISORDERS: ICD-10-CM

## 2024-06-17 DIAGNOSIS — Z82.49 FAMILY HISTORY OF ISCHEMIC HEART DISEASE AND OTHER DISEASES OF THE CIRCULATORY SYSTEM: ICD-10-CM

## 2024-09-11 ENCOUNTER — APPOINTMENT (OUTPATIENT)
Dept: INFECTIOUS DISEASE | Facility: CLINIC | Age: 89
End: 2024-09-11

## 2024-09-19 ENCOUNTER — APPOINTMENT (OUTPATIENT)
Dept: INFECTIOUS DISEASE | Facility: CLINIC | Age: 89
End: 2024-09-19

## 2024-12-17 NOTE — ED ADULT TRIAGE NOTE - TEMPERATURE IN FAHRENHEIT (DEGREES F)
[FreeTextEntry1] : Disscussed etiology and healing s/p surgery March 15th at [place]- she is aware that bones can continue to remodel for a year.  She returned to normal activity but continues to have pain in the 2nd toe The EMLA cream, ROM, compression and diclofenac gel have not helped Custom molded foot orthotics (no authorization as checked on 4/30/24) dispensed 8/20/24 have not helped Discussed surgical risks again including but not limited clot, infection, non healing, nerve damage she and her mother who is accompanying her understand  She would like to schedule the discussed surgery to have a date and if the pain resolves prior then the surgery would not be needed Plan repeat Xrays She is aware surgery would include arthrodesis 2nd toe right, Cavanaugh bunionectomy and scar revision. 1.5 hours with 2,2 [employer] screws 98.6